# Patient Record
Sex: MALE | Race: WHITE | NOT HISPANIC OR LATINO | Employment: OTHER | RURAL
[De-identification: names, ages, dates, MRNs, and addresses within clinical notes are randomized per-mention and may not be internally consistent; named-entity substitution may affect disease eponyms.]

---

## 2021-05-27 ENCOUNTER — OFFICE VISIT (OUTPATIENT)
Dept: FAMILY MEDICINE | Facility: CLINIC | Age: 66
End: 2021-05-27
Payer: COMMERCIAL

## 2021-05-27 VITALS
HEIGHT: 68 IN | BODY MASS INDEX: 35.49 KG/M2 | DIASTOLIC BLOOD PRESSURE: 70 MMHG | HEART RATE: 67 BPM | RESPIRATION RATE: 20 BRPM | OXYGEN SATURATION: 98 % | WEIGHT: 234.19 LBS | TEMPERATURE: 98 F | SYSTOLIC BLOOD PRESSURE: 109 MMHG

## 2021-05-27 DIAGNOSIS — I10 ESSENTIAL HYPERTENSION: ICD-10-CM

## 2021-05-27 DIAGNOSIS — R11.0 NAUSEA: ICD-10-CM

## 2021-05-27 DIAGNOSIS — R51.9 ACUTE NONINTRACTABLE HEADACHE, UNSPECIFIED HEADACHE TYPE: ICD-10-CM

## 2021-05-27 DIAGNOSIS — R05.9 COUGH: ICD-10-CM

## 2021-05-27 DIAGNOSIS — Z20.822 EXPOSURE TO COVID-19 VIRUS: ICD-10-CM

## 2021-05-27 DIAGNOSIS — J44.9 CHRONIC OBSTRUCTIVE PULMONARY DISEASE, UNSPECIFIED COPD TYPE: ICD-10-CM

## 2021-05-27 DIAGNOSIS — U07.1 COVID-19: Primary | ICD-10-CM

## 2021-05-27 DIAGNOSIS — R53.83 FATIGUE, UNSPECIFIED TYPE: ICD-10-CM

## 2021-05-27 DIAGNOSIS — R06.02 SOB (SHORTNESS OF BREATH): ICD-10-CM

## 2021-05-27 DIAGNOSIS — I25.10 CAD, MULTIPLE VESSEL: ICD-10-CM

## 2021-05-27 LAB
CTP QC/QA: YES
SARS-COV-2 AG RESP QL IA.RAPID: POSITIVE

## 2021-05-27 PROCEDURE — 1126F PR PAIN SEVERITY QUANTIFIED, NO PAIN PRESENT: ICD-10-PCS | Mod: ,,, | Performed by: NURSE PRACTITIONER

## 2021-05-27 PROCEDURE — 1126F AMNT PAIN NOTED NONE PRSNT: CPT | Mod: ,,, | Performed by: NURSE PRACTITIONER

## 2021-05-27 PROCEDURE — 3008F PR BODY MASS INDEX (BMI) DOCUMENTED: ICD-10-PCS | Mod: CPTII,,, | Performed by: NURSE PRACTITIONER

## 2021-05-27 PROCEDURE — 87426 SARS CORONAVIRUS 2 ANTIGEN POCT: ICD-10-PCS | Mod: QW,,, | Performed by: NURSE PRACTITIONER

## 2021-05-27 PROCEDURE — 99214 OFFICE O/P EST MOD 30 MIN: CPT | Mod: ,,, | Performed by: NURSE PRACTITIONER

## 2021-05-27 PROCEDURE — 99214 PR OFFICE/OUTPT VISIT, EST, LEVL IV, 30-39 MIN: ICD-10-PCS | Mod: ,,, | Performed by: NURSE PRACTITIONER

## 2021-05-27 PROCEDURE — 87426 SARSCOV CORONAVIRUS AG IA: CPT | Mod: QW,,, | Performed by: NURSE PRACTITIONER

## 2021-05-27 PROCEDURE — 3008F BODY MASS INDEX DOCD: CPT | Mod: CPTII,,, | Performed by: NURSE PRACTITIONER

## 2021-05-27 RX ORDER — HYDROCHLOROTHIAZIDE 25 MG/1
1 TABLET ORAL DAILY
COMMUNITY
Start: 2021-03-02

## 2021-05-27 RX ORDER — FOLIC ACID 1 MG/1
2 TABLET ORAL DAILY
COMMUNITY
Start: 2021-04-14

## 2021-05-27 RX ORDER — POTASSIUM CHLORIDE 750 MG/1
1 CAPSULE, EXTENDED RELEASE ORAL DAILY
COMMUNITY
Start: 2021-04-14 | End: 2023-09-05

## 2021-05-27 RX ORDER — PREDNISONE 10 MG/1
TABLET ORAL
Qty: 15 TABLET | Refills: 0 | Status: SHIPPED | OUTPATIENT
Start: 2021-05-27 | End: 2021-07-06 | Stop reason: ALTCHOICE

## 2021-05-27 RX ORDER — FUROSEMIDE 40 MG/1
TABLET ORAL
COMMUNITY
Start: 2021-03-02 | End: 2023-09-05

## 2021-05-27 RX ORDER — AZITHROMYCIN 250 MG/1
TABLET, FILM COATED ORAL
Qty: 6 TABLET | Refills: 0 | Status: SHIPPED | OUTPATIENT
Start: 2021-05-27 | End: 2021-07-06 | Stop reason: ALTCHOICE

## 2021-05-27 RX ORDER — ALBUTEROL SULFATE 90 UG/1
2 AEROSOL, METERED RESPIRATORY (INHALATION) EVERY 4 HOURS PRN
Qty: 18 G | Refills: 1 | Status: SHIPPED | OUTPATIENT
Start: 2021-05-27 | End: 2023-09-05

## 2021-05-27 RX ORDER — DULOXETIN HYDROCHLORIDE 30 MG/1
30 CAPSULE, DELAYED RELEASE ORAL DAILY
COMMUNITY
Start: 2021-04-21

## 2021-05-27 RX ORDER — METOPROLOL TARTRATE 25 MG/1
25 TABLET, FILM COATED ORAL DAILY
COMMUNITY
Start: 2021-05-03

## 2021-05-27 RX ORDER — LOSARTAN POTASSIUM AND HYDROCHLOROTHIAZIDE 12.5; 1 MG/1; MG/1
1 TABLET ORAL DAILY
COMMUNITY
Start: 2020-12-17

## 2021-05-27 RX ORDER — LOSARTAN POTASSIUM 100 MG/1
100 TABLET ORAL DAILY
COMMUNITY
Start: 2021-05-03

## 2021-05-27 RX ORDER — AMOXICILLIN 500 MG
1 CAPSULE ORAL DAILY
COMMUNITY

## 2021-05-27 RX ORDER — MULTIVIT WITH MINERALS/HERBS
1 TABLET ORAL DAILY
COMMUNITY

## 2021-05-27 RX ORDER — AMLODIPINE BESYLATE 10 MG/1
10 TABLET ORAL DAILY
COMMUNITY
Start: 2021-05-03

## 2021-07-06 ENCOUNTER — OFFICE VISIT (OUTPATIENT)
Dept: FAMILY MEDICINE | Facility: CLINIC | Age: 66
End: 2021-07-06
Payer: COMMERCIAL

## 2021-07-06 VITALS
DIASTOLIC BLOOD PRESSURE: 70 MMHG | RESPIRATION RATE: 20 BRPM | BODY MASS INDEX: 35.95 KG/M2 | OXYGEN SATURATION: 98 % | HEART RATE: 79 BPM | SYSTOLIC BLOOD PRESSURE: 114 MMHG | HEIGHT: 68 IN | TEMPERATURE: 98 F | WEIGHT: 237.19 LBS

## 2021-07-06 DIAGNOSIS — I25.10 CAD, MULTIPLE VESSEL: ICD-10-CM

## 2021-07-06 DIAGNOSIS — I10 ESSENTIAL HYPERTENSION: ICD-10-CM

## 2021-07-06 DIAGNOSIS — G62.9 NEUROPATHY: ICD-10-CM

## 2021-07-06 DIAGNOSIS — Z86.16 PERSONAL HISTORY OF COVID-19: ICD-10-CM

## 2021-07-06 DIAGNOSIS — R25.2 LEG CRAMPING: ICD-10-CM

## 2021-07-06 DIAGNOSIS — J44.9 CHRONIC OBSTRUCTIVE PULMONARY DISEASE, UNSPECIFIED COPD TYPE: Primary | ICD-10-CM

## 2021-07-06 PROCEDURE — 99213 PR OFFICE/OUTPT VISIT, EST, LEVL III, 20-29 MIN: ICD-10-PCS | Mod: ,,, | Performed by: NURSE PRACTITIONER

## 2021-07-06 PROCEDURE — 3008F PR BODY MASS INDEX (BMI) DOCUMENTED: ICD-10-PCS | Mod: CPTII,,, | Performed by: NURSE PRACTITIONER

## 2021-07-06 PROCEDURE — 1125F PR PAIN SEVERITY QUANTIFIED, PAIN PRESENT: ICD-10-PCS | Mod: ,,, | Performed by: NURSE PRACTITIONER

## 2021-07-06 PROCEDURE — 3008F BODY MASS INDEX DOCD: CPT | Mod: CPTII,,, | Performed by: NURSE PRACTITIONER

## 2021-07-06 PROCEDURE — 99213 OFFICE O/P EST LOW 20 MIN: CPT | Mod: ,,, | Performed by: NURSE PRACTITIONER

## 2021-07-06 PROCEDURE — 1125F AMNT PAIN NOTED PAIN PRSNT: CPT | Mod: ,,, | Performed by: NURSE PRACTITIONER

## 2021-07-06 RX ORDER — CHOLECALCIFEROL (VITAMIN D3) 25 MCG
1000 TABLET ORAL DAILY
COMMUNITY

## 2021-07-06 RX ORDER — CYCLOBENZAPRINE HCL 10 MG
10 TABLET ORAL NIGHTLY PRN
Qty: 30 TABLET | Refills: 2 | Status: SHIPPED | OUTPATIENT
Start: 2021-07-06 | End: 2021-08-30 | Stop reason: SDUPTHER

## 2021-08-30 DIAGNOSIS — R25.2 LEG CRAMPING: ICD-10-CM

## 2021-08-30 RX ORDER — CYCLOBENZAPRINE HCL 10 MG
10 TABLET ORAL NIGHTLY PRN
Qty: 30 TABLET | Refills: 2 | Status: SHIPPED | OUTPATIENT
Start: 2021-08-30

## 2022-11-28 LAB — HEMOCCULT STL QL IA: NEGATIVE

## 2023-09-05 ENCOUNTER — OFFICE VISIT (OUTPATIENT)
Dept: FAMILY MEDICINE | Facility: CLINIC | Age: 68
End: 2023-09-05
Payer: MEDICARE

## 2023-09-05 VITALS
TEMPERATURE: 99 F | RESPIRATION RATE: 20 BRPM | DIASTOLIC BLOOD PRESSURE: 74 MMHG | SYSTOLIC BLOOD PRESSURE: 147 MMHG | WEIGHT: 199.81 LBS | HEART RATE: 96 BPM | BODY MASS INDEX: 30.28 KG/M2 | OXYGEN SATURATION: 96 % | HEIGHT: 68 IN

## 2023-09-05 DIAGNOSIS — F33.9 DEPRESSION, RECURRENT: ICD-10-CM

## 2023-09-05 DIAGNOSIS — M10.9 ACUTE GOUT OF RIGHT WRIST, UNSPECIFIED CAUSE: Primary | ICD-10-CM

## 2023-09-05 DIAGNOSIS — Z79.899 ENCOUNTER FOR LONG-TERM (CURRENT) USE OF MEDICATIONS: ICD-10-CM

## 2023-09-05 DIAGNOSIS — Z12.11 SCREENING FOR COLON CANCER: ICD-10-CM

## 2023-09-05 DIAGNOSIS — Z12.11 SCREENING FOR COLON CANCER: Primary | ICD-10-CM

## 2023-09-05 DIAGNOSIS — Z13.31 POSITIVE DEPRESSION SCREENING: ICD-10-CM

## 2023-09-05 DIAGNOSIS — Z86.69 HISTORY OF CARPAL TUNNEL SYNDROME: ICD-10-CM

## 2023-09-05 DIAGNOSIS — Z13.1 SCREENING FOR DIABETES MELLITUS: ICD-10-CM

## 2023-09-05 DIAGNOSIS — Z13.220 SCREENING FOR LIPID DISORDERS: ICD-10-CM

## 2023-09-05 DIAGNOSIS — I25.10 CORONARY ARTERY DISEASE, UNSPECIFIED VESSEL OR LESION TYPE, UNSPECIFIED WHETHER ANGINA PRESENT, UNSPECIFIED WHETHER NATIVE OR TRANSPLANTED HEART: ICD-10-CM

## 2023-09-05 LAB
ALBUMIN SERPL BCP-MCNC: 3.2 G/DL (ref 3.5–5)
ALBUMIN/GLOB SERPL: 0.7 {RATIO}
ALP SERPL-CCNC: 112 U/L (ref 45–115)
ALT SERPL W P-5'-P-CCNC: 22 U/L (ref 16–61)
ANION GAP SERPL CALCULATED.3IONS-SCNC: 11 MMOL/L (ref 7–16)
AST SERPL W P-5'-P-CCNC: 23 U/L (ref 15–37)
BILIRUB SERPL-MCNC: 2.6 MG/DL (ref ?–1.2)
BUN SERPL-MCNC: 10 MG/DL (ref 7–18)
BUN/CREAT SERPL: 11 (ref 6–20)
CALCIUM SERPL-MCNC: 9.6 MG/DL (ref 8.5–10.1)
CHLORIDE SERPL-SCNC: 96 MMOL/L (ref 98–107)
CHOLEST SERPL-MCNC: 88 MG/DL (ref 0–200)
CHOLEST/HDLC SERPL: 2.8 {RATIO}
CO2 SERPL-SCNC: 29 MMOL/L (ref 21–32)
CREAT SERPL-MCNC: 0.94 MG/DL (ref 0.7–1.3)
EGFR (NO RACE VARIABLE) (RUSH/TITUS): 89 ML/MIN/1.73M2
EST. AVERAGE GLUCOSE BLD GHB EST-MCNC: 97 MG/DL
GLOBULIN SER-MCNC: 4.5 G/DL (ref 2–4)
GLUCOSE SERPL-MCNC: 125 MG/DL (ref 74–106)
HBA1C MFR BLD HPLC: 5.5 % (ref 4.5–6.6)
HDLC SERPL-MCNC: 31 MG/DL (ref 40–60)
LDLC SERPL CALC-MCNC: 37 MG/DL
LDLC/HDLC SERPL: 1.2 {RATIO}
NONHDLC SERPL-MCNC: 57 MG/DL
POTASSIUM SERPL-SCNC: 3.3 MMOL/L (ref 3.5–5.1)
PROT SERPL-MCNC: 7.7 G/DL (ref 6.4–8.2)
SODIUM SERPL-SCNC: 133 MMOL/L (ref 136–145)
TRIGL SERPL-MCNC: 100 MG/DL (ref 35–150)
URATE SERPL-MCNC: 6 MG/DL (ref 3.5–7.2)
VLDLC SERPL-MCNC: 20 MG/DL

## 2023-09-05 PROCEDURE — 80053 COMPREHENSIVE METABOLIC PANEL: ICD-10-PCS | Mod: ,,, | Performed by: CLINICAL MEDICAL LABORATORY

## 2023-09-05 PROCEDURE — 3077F PR MOST RECENT SYSTOLIC BLOOD PRESSURE >= 140 MM HG: ICD-10-PCS | Mod: ,,, | Performed by: NURSE PRACTITIONER

## 2023-09-05 PROCEDURE — 1160F RVW MEDS BY RX/DR IN RCRD: CPT | Mod: ,,, | Performed by: NURSE PRACTITIONER

## 2023-09-05 PROCEDURE — 3077F SYST BP >= 140 MM HG: CPT | Mod: ,,, | Performed by: NURSE PRACTITIONER

## 2023-09-05 PROCEDURE — 99214 OFFICE O/P EST MOD 30 MIN: CPT | Mod: 25,,, | Performed by: NURSE PRACTITIONER

## 2023-09-05 PROCEDURE — 83036 HEMOGLOBIN GLYCOSYLATED A1C: CPT | Mod: ,,, | Performed by: CLINICAL MEDICAL LABORATORY

## 2023-09-05 PROCEDURE — 99214 PR OFFICE/OUTPT VISIT, EST, LEVL IV, 30-39 MIN: ICD-10-PCS | Mod: 25,,, | Performed by: NURSE PRACTITIONER

## 2023-09-05 PROCEDURE — 3078F DIAST BP <80 MM HG: CPT | Mod: ,,, | Performed by: NURSE PRACTITIONER

## 2023-09-05 PROCEDURE — 1100F PTFALLS ASSESS-DOCD GE2>/YR: CPT | Mod: ,,, | Performed by: NURSE PRACTITIONER

## 2023-09-05 PROCEDURE — 3288F PR FALLS RISK ASSESSMENT DOCUMENTED: ICD-10-PCS | Mod: ,,, | Performed by: NURSE PRACTITIONER

## 2023-09-05 PROCEDURE — 3008F BODY MASS INDEX DOCD: CPT | Mod: ,,, | Performed by: NURSE PRACTITIONER

## 2023-09-05 PROCEDURE — 84550 URIC ACID: ICD-10-PCS | Mod: ,,, | Performed by: CLINICAL MEDICAL LABORATORY

## 2023-09-05 PROCEDURE — 1159F PR MEDICATION LIST DOCUMENTED IN MEDICAL RECORD: ICD-10-PCS | Mod: ,,, | Performed by: NURSE PRACTITIONER

## 2023-09-05 PROCEDURE — 96372 THER/PROPH/DIAG INJ SC/IM: CPT | Mod: ,,, | Performed by: NURSE PRACTITIONER

## 2023-09-05 PROCEDURE — 3078F PR MOST RECENT DIASTOLIC BLOOD PRESSURE < 80 MM HG: ICD-10-PCS | Mod: ,,, | Performed by: NURSE PRACTITIONER

## 2023-09-05 PROCEDURE — 4010F ACE/ARB THERAPY RXD/TAKEN: CPT | Mod: ,,, | Performed by: NURSE PRACTITIONER

## 2023-09-05 PROCEDURE — 1159F MED LIST DOCD IN RCRD: CPT | Mod: ,,, | Performed by: NURSE PRACTITIONER

## 2023-09-05 PROCEDURE — 96372 PR INJECTION,THERAP/PROPH/DIAG2ST, IM OR SUBCUT: ICD-10-PCS | Mod: ,,, | Performed by: NURSE PRACTITIONER

## 2023-09-05 PROCEDURE — 80061 LIPID PANEL: CPT | Mod: ,,, | Performed by: CLINICAL MEDICAL LABORATORY

## 2023-09-05 PROCEDURE — 1125F AMNT PAIN NOTED PAIN PRSNT: CPT | Mod: ,,, | Performed by: NURSE PRACTITIONER

## 2023-09-05 PROCEDURE — 83036 HEMOGLOBIN A1C: ICD-10-PCS | Mod: ,,, | Performed by: CLINICAL MEDICAL LABORATORY

## 2023-09-05 PROCEDURE — 4010F PR ACE/ARB THEARPY RXD/TAKEN: ICD-10-PCS | Mod: ,,, | Performed by: NURSE PRACTITIONER

## 2023-09-05 PROCEDURE — 80061 LIPID PANEL: ICD-10-PCS | Mod: ,,, | Performed by: CLINICAL MEDICAL LABORATORY

## 2023-09-05 PROCEDURE — 1160F PR REVIEW ALL MEDS BY PRESCRIBER/CLIN PHARMACIST DOCUMENTED: ICD-10-PCS | Mod: ,,, | Performed by: NURSE PRACTITIONER

## 2023-09-05 PROCEDURE — 3008F PR BODY MASS INDEX (BMI) DOCUMENTED: ICD-10-PCS | Mod: ,,, | Performed by: NURSE PRACTITIONER

## 2023-09-05 PROCEDURE — 1125F PR PAIN SEVERITY QUANTIFIED, PAIN PRESENT: ICD-10-PCS | Mod: ,,, | Performed by: NURSE PRACTITIONER

## 2023-09-05 PROCEDURE — 1100F PR PT FALLS ASSESS DOC 2+ FALLS/FALL W/INJURY/YR: ICD-10-PCS | Mod: ,,, | Performed by: NURSE PRACTITIONER

## 2023-09-05 PROCEDURE — 80053 COMPREHEN METABOLIC PANEL: CPT | Mod: ,,, | Performed by: CLINICAL MEDICAL LABORATORY

## 2023-09-05 PROCEDURE — 84550 ASSAY OF BLOOD/URIC ACID: CPT | Mod: ,,, | Performed by: CLINICAL MEDICAL LABORATORY

## 2023-09-05 PROCEDURE — 3288F FALL RISK ASSESSMENT DOCD: CPT | Mod: ,,, | Performed by: NURSE PRACTITIONER

## 2023-09-05 RX ORDER — PREDNISONE 10 MG/1
TABLET ORAL
Qty: 14 TABLET | Refills: 0 | Status: SHIPPED | OUTPATIENT
Start: 2023-09-05 | End: 2023-09-05

## 2023-09-05 RX ORDER — DEXAMETHASONE SODIUM PHOSPHATE 4 MG/ML
4 INJECTION, SOLUTION INTRA-ARTICULAR; INTRALESIONAL; INTRAMUSCULAR; INTRAVENOUS; SOFT TISSUE
Status: COMPLETED | OUTPATIENT
Start: 2023-09-05 | End: 2023-09-05

## 2023-09-05 RX ORDER — COLCHICINE 0.6 MG/1
0.6 TABLET ORAL DAILY
Qty: 15 TABLET | Refills: 0 | Status: SHIPPED | OUTPATIENT
Start: 2023-09-05 | End: 2023-09-05

## 2023-09-05 RX ORDER — KETOROLAC TROMETHAMINE 30 MG/ML
30 INJECTION, SOLUTION INTRAMUSCULAR; INTRAVENOUS ONCE
Status: COMPLETED | OUTPATIENT
Start: 2023-09-05 | End: 2023-09-05

## 2023-09-05 RX ORDER — ALLOPURINOL 300 MG/1
300 TABLET ORAL DAILY
COMMUNITY
Start: 2023-06-09

## 2023-09-05 RX ORDER — POLYETHYLENE GLYCOL 3350, SODIUM SULFATE ANHYDROUS, SODIUM BICARBONATE, SODIUM CHLORIDE, POTASSIUM CHLORIDE 236; 22.74; 6.74; 5.86; 2.97 G/4L; G/4L; G/4L; G/4L; G/4L
4 POWDER, FOR SOLUTION ORAL ONCE
Qty: 4000 ML | Refills: 0 | Status: SHIPPED | OUTPATIENT
Start: 2023-09-05 | End: 2023-09-05

## 2023-09-05 RX ORDER — PREGABALIN 100 MG/1
100 CAPSULE ORAL DAILY
COMMUNITY
Start: 2023-06-14

## 2023-09-05 RX ORDER — EVOLOCUMAB 140 MG/ML
140 INJECTION, SOLUTION SUBCUTANEOUS
COMMUNITY
Start: 2023-08-04

## 2023-09-05 RX ORDER — POTASSIUM CHLORIDE 1500 MG/1
20 TABLET, EXTENDED RELEASE ORAL 2 TIMES DAILY
COMMUNITY

## 2023-09-05 RX ORDER — PREDNISONE 10 MG/1
TABLET ORAL
Qty: 14 TABLET | Refills: 0 | Status: SHIPPED | OUTPATIENT
Start: 2023-09-05 | End: 2023-09-11

## 2023-09-05 RX ORDER — FUROSEMIDE 80 MG/1
80 TABLET ORAL 2 TIMES DAILY
COMMUNITY
Start: 2023-07-13

## 2023-09-05 RX ORDER — HYDROCODONE BITARTRATE AND ACETAMINOPHEN 10; 325 MG/1; MG/1
1 TABLET ORAL EVERY 6 HOURS PRN
COMMUNITY
Start: 2023-08-24

## 2023-09-05 RX ORDER — COLCHICINE 0.6 MG/1
TABLET ORAL
Qty: 15 TABLET | Refills: 0 | Status: SHIPPED | OUTPATIENT
Start: 2023-09-05

## 2023-09-05 RX ADMIN — KETOROLAC TROMETHAMINE 30 MG: 30 INJECTION, SOLUTION INTRAMUSCULAR; INTRAVENOUS at 08:09

## 2023-09-05 RX ADMIN — DEXAMETHASONE SODIUM PHOSPHATE 4 MG: 4 INJECTION, SOLUTION INTRA-ARTICULAR; INTRALESIONAL; INTRAMUSCULAR; INTRAVENOUS; SOFT TISSUE at 08:09

## 2023-09-05 NOTE — PROGRESS NOTES
GILDARDO Amador   [facility]      PATIENT NAME: MARLO JONES  : 1955  DATE: 23  MRN: [id number]      Patient PCP Information       Provider PCP Type    GILDARDO Arguello [facility]            Reason for Visit / Chief Complaint: Hand Pain and Arm Pain (Patient presents to clinic for referral for carpal tunnel surgery pt states even pain pills are not helping. RM 1)      History of Present Illness / Problem Focused Workflow     MARLO JONES presents to the clinic with Hand Pain and Arm Pain (Patient presents to clinic for referral for carpal tunnel surgery pt states even pain pills are not helping. RM 1)     HPI  Mr Jones presents to clinic with right hand pain and swelling. Patient presents to clinic in carpal tunnel splint and sling. Patient reports pain and swelling radiating from hand, migrating to wrist and elbow. Patient has prior medical hx of COPD, CAD (stent and CABG), hyperlipidemia, HTN, and neuropathy. Patient states he was followed by Sheryl in past for carpal tunnel. States surgery was recommended in past.   Patient does take allopurinol 300 mg daily for gout prophylaxis. States flares have been in hand and great toe in past.   Reports decreased urine output and weight loss. Noncompliant with follow up with PCP.       Review of Systems     Review of Systems   Constitutional:  Negative for activity change, appetite change, chills, diaphoresis, fatigue, fever and unexpected weight change.   HENT:  Negative for congestion, ear pain, facial swelling, hearing loss, nosebleeds and sore throat.    Eyes: Negative.    Respiratory:  Negative for apnea, cough, shortness of breath and wheezing.    Cardiovascular:  Negative for chest pain, palpitations and leg swelling.   Gastrointestinal:  Negative for abdominal distention, abdominal pain, blood in stool, constipation, diarrhea and nausea.   Endocrine: Negative for cold intolerance, heat intolerance, polydipsia, polyphagia  and polyuria.   Genitourinary:  Negative for decreased urine volume, difficulty urinating, dysuria, flank pain, frequency, hematuria and urgency.   Musculoskeletal:  Positive for arthralgias and joint swelling. Negative for myalgias.   Skin:  Negative for color change and rash.   Allergic/Immunologic: Negative.    Neurological:  Negative for dizziness, tremors, seizures, syncope, facial asymmetry, speech difficulty, weakness, light-headedness, numbness and headaches.   Hematological:  Negative for adenopathy. Does not bruise/bleed easily.   Psychiatric/Behavioral:  Negative for behavioral problems and confusion.        Medical / Social / Family History     Past Medical History:   Diagnosis Date    CAD, multiple vessel     COPD (chronic obstructive pulmonary disease)     Heart disease     Hyperlipidemia     Hypertension     Myocardial infarction 2018    Neuropathy        Past Surgical History:   Procedure Laterality Date    CORONARY ARTERY BYPASS GRAFT (CABG)  2017    per Dr. Richards       Social History    reports that he quit smoking about 53 years ago. His smoking use included cigarettes. He has been exposed to tobacco smoke. He has never used smokeless tobacco. He reports current alcohol use. He reports that he does not use drugs.    Family History  's family history includes Alzheimer's disease in his father; Cancer in his sister; Heart disease in his brother, brother, daughter, and mother; No Known Problems in his daughter, son, and son.    Medications and Allergies     Medications  Outpatient Medications Marked as Taking for the 9/5/23 encounter (Office Visit) with Orquidea Maravilla FNP   Medication Sig Dispense Refill    allopurinoL (ZYLOPRIM) 300 MG tablet Take 300 mg by mouth once daily.      amLODIPine (NORVASC) 10 MG tablet Take 10 mg by mouth once daily.      b complex vitamins tablet Take 1 tablet by mouth once daily.      cyclobenzaprine (FLEXERIL) 10 MG tablet Take 1 tablet (10 mg total) by mouth  nightly as needed for Muscle spasms. 30 tablet 2    DULoxetine (CYMBALTA) 30 MG capsule Take 30 mg by mouth once daily.      folic acid (FOLVITE) 1 MG tablet Take 2 mg by mouth once daily.      furosemide (LASIX) 80 MG tablet Take 80 mg by mouth 2 (two) times daily.      hydroCHLOROthiazide (HYDRODIURIL) 25 MG tablet Take 1 tablet by mouth once daily.      HYDROcodone-acetaminophen (NORCO)  mg per tablet Take 1 tablet by mouth every 6 (six) hours as needed.      losartan (COZAAR) 100 MG tablet Take 100 mg by mouth once daily.      losartan-hydrochlorothiazide 100-12.5 mg (HYZAAR) 100-12.5 mg Tab Take 1 tablet by mouth once daily.      magnesium chloride (MAG 64 ORAL) Take 2 tablets by mouth once daily.      metoprolol tartrate (LOPRESSOR) 25 MG tablet Take 25 mg by mouth once daily.      omega-3 fatty acids/fish oil (FISH OIL-OMEGA-3 FATTY ACIDS) 300-1,000 mg capsule Take 1 capsule by mouth once daily.      potassium chloride (K-TAB) 20 mEq Take 20 mEq by mouth 2 (two) times a day.      pregabalin (LYRICA) 100 MG capsule Take 100 mg by mouth once daily.      REPATHA SURECLICK 140 mg/mL PnIj Inject 140 mg into the skin every 14 (fourteen) days.      vitamin D (VITAMIN D3) 1000 units Tab Take 1,000 Units by mouth once daily.      ZINC ORAL Take 1 tablet by mouth once daily.      [DISCONTINUED] potassium chloride (MICRO-K) 10 MEQ CpSR Take 1 mEq by mouth once daily.       Current Facility-Administered Medications for the 9/5/23 encounter (Office Visit) with Orquidea Maravilla FNP   Medication Dose Route Frequency Provider Last Rate Last Admin    [COMPLETED] dexAMETHasone injection 4 mg  4 mg Intramuscular 1 time in Clinic/HOD Orquidea Maravilla FNP   4 mg at 09/05/23 0802    [COMPLETED] ketorolac injection 30 mg  30 mg Intramuscular Once Orquidea Maravilla FNP   30 mg at 09/05/23 0802       Allergies  Review of patient's allergies indicates:   Allergen Reactions    Lisinopril Shortness Of Breath     Patient  "stated that his throat closed and face swelling had to be put on vent.       Physical Examination     Vitals:    09/05/23 0735 09/05/23 0737   BP: (!) 156/90 (!) 147/74   BP Location: Left arm    Patient Position: Sitting    BP Method: Medium (Automatic)    Pulse: 96    Resp: 20    Temp: 98.8 °F (37.1 °C)    TempSrc: Oral    SpO2: 96%    Weight: 90.6 kg (199 lb 12.8 oz)    Height: 5' 8" (1.727 m)    Over the last two weeks how often have you been bothered by little interest or pleasure in doing things: 1  Over the last two weeks how often have you been bothered by feeling down, depressed or hopeless: 0  PHQ-2 Total Score: 1  PHQ-9 Score: 5  PHQ-9 Interpretation: Mild    I have used clinical judgement based on duration and functional status to consider definite necessity for treatment.   Physical Exam  Vitals reviewed.   Constitutional:       Appearance: Normal appearance. He is obese.   HENT:      Head: Normocephalic.      Right Ear: External ear normal.      Left Ear: External ear normal.      Nose: Nose normal.      Mouth/Throat:      Mouth: Mucous membranes are moist.   Eyes:      Extraocular Movements: Extraocular movements intact.   Cardiovascular:      Rate and Rhythm: Normal rate.      Pulses: Normal pulses.   Pulmonary:      Effort: Pulmonary effort is normal.   Musculoskeletal:      Right upper arm: Swelling and tenderness present.      Right wrist: Swelling and tenderness present. Decreased range of motion.      Right hand: Swelling and tenderness present. Decreased range of motion.      Cervical back: Normal range of motion.      Comments: Erythema noted over PIP joint thumb hand swelling extending to wrist  Decreased ROM secondary to pain   Skin:     General: Skin is warm.      Capillary Refill: Capillary refill takes less than 2 seconds.   Neurological:      General: No focal deficit present.      Mental Status: He is alert and oriented to person, place, and time.   Psychiatric:         Mood and Affect: " Mood normal.         Behavior: Behavior normal.         Thought Content: Thought content normal.         Judgment: Judgment normal.           No visits with results within 14 Day(s) from this visit.   Latest known visit with results is:   Office Visit on 05/27/2021   Component Date Value Ref Range Status    SARS Coronavirus 2 Antigen 05/27/2021 Positive (A)  Negative Final     Acceptable 05/27/2021 Yes   Final             Assessment and Plan (including Health Maintenance)         Plan:   Acute gout of right wrist, unspecified cause  -     dexAMETHasone injection 4 mg  -     ketorolac injection 30 mg  -     Uric Acid; Future; Expected date: 09/05/2023  -     predniSONE (DELTASONE) 10 MG tablet; Take 4 tablets (40 mg total) by mouth once daily for 2 days, THEN 2 tablets (20 mg total) once daily for 2 days, THEN 1 tablet (10 mg total) once daily for 2 days.  Dispense: 14 tablet; Refill: 0  -     colchicine, gout, (COLCRYS) 0.6 mg tablet; 1.2 mg (2 tablets) PO x 1, then 0.6 mg in 1 hour. Do not repeat doing for 72 hours  Dispense: 15 tablet; Refill: 0  -continue allopurinol  -CMP     History of carpal tunnel syndrome  -     Ambulatory referral/consult to Orthopedics; Future; Expected date: 09/12/2023    Screening for diabetes mellitus  -     Comprehensive Metabolic Panel; Future; Expected date: 09/05/2023  -     Hemoglobin A1C; Future; Expected date: 09/05/2023    Screening for lipid disorders  -     Lipid Panel; Future; Expected date: 09/05/2023    Screening for colon cancer  -     Colonoscopy; Future; Expected date: 09/05/2023    Encounter for long-term (current) use of medications  -     Hemoglobin A1C; Future; Expected date: 09/05/2023    BMI 30.0-30.9,adult  -     Comprehensive Metabolic Panel; Future; Expected date: 09/05/2023  -     Lipid Panel; Future; Expected date: 09/05/2023    Coronary artery disease, unspecified vessel or lesion type, unspecified whether angina present, unspecified whether  native or transplanted heart  -     Comprehensive Metabolic Panel; Future; Expected date: 09/05/2023  -     Lipid Panel; Future; Expected date: 09/05/2023  -     Hemoglobin A1C; Future; Expected date: 09/05/2023  -     followed by Maya    Positive depression screening/ Hx of depressive disorder  Comments:  I have reviewed the positive depression score with the patient and found that no additional intervention is needed at this time. In pain today, repeat screening on follow up               Follow up with PCP continue current medication     Orquidea DE LA CRUZ  There are no Patient Instructions on file for this visit.       Health Maintenance Due   Topic Date Due    Hepatitis C Screening  Never done    Lipid Panel  Never done    COVID-19 Vaccine (1) Never done    Pneumococcal Vaccines (Age 65+) (1 - PCV) Never done    TETANUS VACCINE  Never done    High Dose Statin  Never done    Hemoglobin A1c (Diabetic Prevention Screening)  Never done    Colorectal Cancer Screening  Never done    Shingles Vaccine (1 of 2) Never done    Abdominal Aortic Aneurysm Screening  Never done    Influenza Vaccine (1) Never done         There is no immunization history on file for this patient.     Problem List Items Addressed This Visit          Psychiatric    Depression, recurrent     Other Visit Diagnoses       Acute gout of right wrist, unspecified cause    -  Primary    Relevant Medications    dexAMETHasone injection 4 mg (Completed)    ketorolac injection 30 mg (Completed)    predniSONE (DELTASONE) 10 MG tablet    colchicine, gout, (COLCRYS) 0.6 mg tablet    Other Relevant Orders    Uric Acid    History of carpal tunnel syndrome        Relevant Orders    Ambulatory referral/consult to Orthopedics    Screening for diabetes mellitus        Relevant Orders    Comprehensive Metabolic Panel    Hemoglobin A1C    Screening for lipid disorders        Relevant Orders    Lipid Panel    Screening for colon cancer        Relevant Orders     Colonoscopy    Encounter for long-term (current) use of medications        Relevant Orders    Hemoglobin A1C    BMI 30.0-30.9,adult        Relevant Orders    Comprehensive Metabolic Panel    Lipid Panel    Coronary artery disease, unspecified vessel or lesion type, unspecified whether angina present, unspecified whether native or transplanted heart        Relevant Orders    Comprehensive Metabolic Panel    Lipid Panel    Hemoglobin A1C    Positive depression screening        I have reviewed the positive depression score with the patient and found that no additional intervention is needed at this time.            The patient has no Health Maintenance topics of status Not Due    Future Appointments   Date Time Provider Department Center   2/22/2024  9:30 AM [facility]              Signature:  GILDARDO Amador  [facility]     Date of encounter: 9/5/23

## 2023-09-09 DIAGNOSIS — Z71.89 COMPLEX CARE COORDINATION: ICD-10-CM

## 2023-09-21 DIAGNOSIS — M25.532 PAIN IN BOTH WRISTS: Primary | ICD-10-CM

## 2023-09-21 DIAGNOSIS — M25.531 PAIN IN BOTH WRISTS: Primary | ICD-10-CM

## 2023-09-22 ENCOUNTER — OFFICE VISIT (OUTPATIENT)
Dept: ORTHOPEDICS | Facility: CLINIC | Age: 68
End: 2023-09-22
Payer: MEDICARE

## 2023-09-22 ENCOUNTER — HOSPITAL ENCOUNTER (OUTPATIENT)
Dept: RADIOLOGY | Facility: HOSPITAL | Age: 68
Discharge: HOME | End: 2023-09-22
Attending: ORTHOPAEDIC SURGERY
Payer: MEDICARE

## 2023-09-22 DIAGNOSIS — M25.531 PAIN IN BOTH WRISTS: Primary | ICD-10-CM

## 2023-09-22 DIAGNOSIS — M19.031 PRIMARY OSTEOARTHRITIS OF BOTH WRISTS: ICD-10-CM

## 2023-09-22 DIAGNOSIS — M25.531 PAIN IN BOTH WRISTS: ICD-10-CM

## 2023-09-22 DIAGNOSIS — M25.532 PAIN IN BOTH WRISTS: ICD-10-CM

## 2023-09-22 DIAGNOSIS — M25.532 PAIN IN BOTH WRISTS: Primary | ICD-10-CM

## 2023-09-22 DIAGNOSIS — Z86.69 HISTORY OF CARPAL TUNNEL SYNDROME: ICD-10-CM

## 2023-09-22 DIAGNOSIS — M19.032 PRIMARY OSTEOARTHRITIS OF BOTH WRISTS: ICD-10-CM

## 2023-09-22 PROCEDURE — 20605 PR DRAIN/INJECT INTERMEDIATE JOINT/BURSA: ICD-10-PCS | Mod: S$PBB,RT,, | Performed by: ORTHOPAEDIC SURGERY

## 2023-09-22 PROCEDURE — 73110 XR WRIST COMPLETE 3 VIEWS BILATERAL: ICD-10-PCS | Mod: 26,50,, | Performed by: ORTHOPAEDIC SURGERY

## 2023-09-22 PROCEDURE — 3044F PR MOST RECENT HEMOGLOBIN A1C LEVEL <7.0%: ICD-10-PCS | Mod: CPTII,,, | Performed by: ORTHOPAEDIC SURGERY

## 2023-09-22 PROCEDURE — 20605 DRAIN/INJ JOINT/BURSA W/O US: CPT | Mod: PBBFAC,RT | Performed by: ORTHOPAEDIC SURGERY

## 2023-09-22 PROCEDURE — 1160F PR REVIEW ALL MEDS BY PRESCRIBER/CLIN PHARMACIST DOCUMENTED: ICD-10-PCS | Mod: CPTII,,, | Performed by: ORTHOPAEDIC SURGERY

## 2023-09-22 PROCEDURE — 99999PBSHW PR PBB SHADOW TECHNICAL ONLY FILED TO HB: ICD-10-PCS | Mod: PBBFAC,,,

## 2023-09-22 PROCEDURE — 73110 X-RAY EXAM OF WRIST: CPT | Mod: 26,50,, | Performed by: ORTHOPAEDIC SURGERY

## 2023-09-22 PROCEDURE — 99214 OFFICE O/P EST MOD 30 MIN: CPT | Mod: PBBFAC,25 | Performed by: ORTHOPAEDIC SURGERY

## 2023-09-22 PROCEDURE — 1159F PR MEDICATION LIST DOCUMENTED IN MEDICAL RECORD: ICD-10-PCS | Mod: CPTII,,, | Performed by: ORTHOPAEDIC SURGERY

## 2023-09-22 PROCEDURE — 99204 OFFICE O/P NEW MOD 45 MIN: CPT | Mod: S$PBB,25,, | Performed by: ORTHOPAEDIC SURGERY

## 2023-09-22 PROCEDURE — 20605 DRAIN/INJ JOINT/BURSA W/O US: CPT | Mod: S$PBB,RT,, | Performed by: ORTHOPAEDIC SURGERY

## 2023-09-22 PROCEDURE — 1160F RVW MEDS BY RX/DR IN RCRD: CPT | Mod: CPTII,,, | Performed by: ORTHOPAEDIC SURGERY

## 2023-09-22 PROCEDURE — 4010F PR ACE/ARB THEARPY RXD/TAKEN: ICD-10-PCS | Mod: CPTII,,, | Performed by: ORTHOPAEDIC SURGERY

## 2023-09-22 PROCEDURE — 4010F ACE/ARB THERAPY RXD/TAKEN: CPT | Mod: CPTII,,, | Performed by: ORTHOPAEDIC SURGERY

## 2023-09-22 PROCEDURE — 1159F MED LIST DOCD IN RCRD: CPT | Mod: CPTII,,, | Performed by: ORTHOPAEDIC SURGERY

## 2023-09-22 PROCEDURE — 3044F HG A1C LEVEL LT 7.0%: CPT | Mod: CPTII,,, | Performed by: ORTHOPAEDIC SURGERY

## 2023-09-22 PROCEDURE — 73110 X-RAY EXAM OF WRIST: CPT | Mod: TC,50

## 2023-09-22 PROCEDURE — 99999PBSHW PR PBB SHADOW TECHNICAL ONLY FILED TO HB: Mod: PBBFAC,,,

## 2023-09-22 PROCEDURE — 99204 PR OFFICE/OUTPT VISIT, NEW, LEVL IV, 45-59 MIN: ICD-10-PCS | Mod: S$PBB,25,, | Performed by: ORTHOPAEDIC SURGERY

## 2023-09-22 RX ORDER — BUPIVACAINE HYDROCHLORIDE 2.5 MG/ML
1 INJECTION, SOLUTION INFILTRATION; PERINEURAL
Status: COMPLETED | OUTPATIENT
Start: 2023-09-22 | End: 2023-09-22

## 2023-09-22 RX ORDER — TRIAMCINOLONE ACETONIDE 40 MG/ML
40 INJECTION, SUSPENSION INTRA-ARTICULAR; INTRAMUSCULAR
Status: COMPLETED | OUTPATIENT
Start: 2023-09-22 | End: 2023-09-22

## 2023-09-22 RX ADMIN — TRIAMCINOLONE ACETONIDE 40 MG: 40 INJECTION, SUSPENSION INTRA-ARTICULAR; INTRAMUSCULAR at 12:09

## 2023-09-22 RX ADMIN — BUPIVACAINE HYDROCHLORIDE 2.5 MG: 2.5 INJECTION, SOLUTION INFILTRATION; PERINEURAL at 12:09

## 2023-09-22 NOTE — PROGRESS NOTES
CLINIC NOTE       Chief Complaint   Patient presents with    Left Hand - Pain    Right Hand - Pain        MARLO HINOJOSA is a 67 y.o. male seen today for evaluation of right wrist pain.  Symptoms began insidiously with no history of trauma.  Has history of peripheral neuropathy of the upper and lower extremities documented with EMG nerve conduction studies performed by Dr. Lo might 04/19/2021.  He has altered sensation in the hands and glove distribution with no difference between median and ulnar nerve distributions.  The nerve study did show some mild carpal tunnel syndrome.  Presently he is experiencing pain in the dorsal aspect of the right wrist.  He does have history of gout and takes allopurinol.  He is on Lyrica for his peripheral neuropathy.  Has a serum uric acid levels checked with his cardiologist and primary care physician.    Past Medical History:   Diagnosis Date    CAD, multiple vessel     COPD (chronic obstructive pulmonary disease)     Heart disease     Hyperlipidemia     Hypertension     Myocardial infarction 2018    Neuropathy      Family History   Problem Relation Age of Onset    Heart disease Mother     Alzheimer's disease Father     Cancer Sister         Breast    Heart disease Brother     Heart disease Daughter     No Known Problems Son     Heart disease Brother     No Known Problems Daughter     No Known Problems Son      Current Outpatient Medications on File Prior to Visit   Medication Sig Dispense Refill    allopurinoL (ZYLOPRIM) 300 MG tablet Take 300 mg by mouth once daily.      amLODIPine (NORVASC) 10 MG tablet Take 10 mg by mouth once daily.      b complex vitamins tablet Take 1 tablet by mouth once daily.      colchicine, gout, (COLCRYS) 0.6 mg tablet 1.2 mg (2 tablets) PO x 1, then 0.6 mg in 1 hour. Do not repeat doing for 72 hours 15 tablet 0    cyclobenzaprine (FLEXERIL) 10 MG tablet Take 1 tablet (10 mg total) by mouth nightly as needed for Muscle spasms. 30 tablet 2     DULoxetine (CYMBALTA) 30 MG capsule Take 30 mg by mouth once daily.      folic acid (FOLVITE) 1 MG tablet Take 2 mg by mouth once daily.      furosemide (LASIX) 80 MG tablet Take 80 mg by mouth 2 (two) times daily.      hydroCHLOROthiazide (HYDRODIURIL) 25 MG tablet Take 1 tablet by mouth once daily.      HYDROcodone-acetaminophen (NORCO)  mg per tablet Take 1 tablet by mouth every 6 (six) hours as needed.      losartan (COZAAR) 100 MG tablet Take 100 mg by mouth once daily.      losartan-hydrochlorothiazide 100-12.5 mg (HYZAAR) 100-12.5 mg Tab Take 1 tablet by mouth once daily.      magnesium chloride (MAG 64 ORAL) Take 2 tablets by mouth once daily.      metoprolol tartrate (LOPRESSOR) 25 MG tablet Take 25 mg by mouth once daily.      omega-3 fatty acids/fish oil (FISH OIL-OMEGA-3 FATTY ACIDS) 300-1,000 mg capsule Take 1 capsule by mouth once daily.      potassium chloride (K-TAB) 20 mEq Take 20 mEq by mouth 2 (two) times a day.      pregabalin (LYRICA) 100 MG capsule Take 100 mg by mouth once daily.      REPATHA SURECLICK 140 mg/mL PnIj Inject 140 mg into the skin every 14 (fourteen) days.      vitamin D (VITAMIN D3) 1000 units Tab Take 1,000 Units by mouth once daily.      ZINC ORAL Take 1 tablet by mouth once daily.       No current facility-administered medications on file prior to visit.       ROS     There were no vitals filed for this visit.    Past Surgical History:   Procedure Laterality Date    CORONARY ARTERY BYPASS GRAFT (CABG)  2017    per Dr. Richards        Review of patient's allergies indicates:   Allergen Reactions    Lisinopril Shortness Of Breath     Patient stated that his throat closed and face swelling had to be put on vent.        Ortho Exam well-developed well-nourished  male no acute distress.  Alert oriented cooperative.  Neck is supple without JVD.  Breathing is regular nonlabored.  Skin is warm dry no lesions seen.  Right wrist is normal contour.  No erythema.  There is  diffuse tenderness palpation over the dorsal carpus.  No significant tenderness palpation over the basilar joint.  Negative basilar joint grind test.  Decreased sensation light touch in the thumb in all lesser fingers with good cap refill to all fingertips and thumb.    Radiographic Examination:  Right wrist 09/22/2023    Technique:  Three views AP lateral oblique    Findings:  Bones well mineralized.  Carpus normally aligned.  There is moderate diffuse DJD of the carpus with cyst formation and some bony resorption involving the ulnar styloid.  Impression:   See Above  Radiographic examination: Left wrist 09/22/2023  Technique three views AP lateral oblique findings: The bones well mineralized.  Carpus normally aligned.  There is mild-to-moderate diffuse DJD of the carpus.  No evidence of acute fracture, dislocation or pathologic bone.  Assessment and Plan  Patient Active Problem List    Diagnosis Date Noted    Depression, recurrent 09/05/2023    Leg cramping 07/06/2021    Personal history of COVID-19 05/27/2021    CAD, multiple vessel     COPD (chronic obstructive pulmonary disease)     Hypertension     Hyperlipidemia     Neuropathy     Impression:  1.  Idiopathic Peripheral neuropathy upper and lower extremities 2.  Moderate DJD-both wrists right greater than left 3. History of gout   Plan:  The right wrist prepped with Betadine and intercarpal steroid injection performed through a dorsal approach with triamcinolone and Marcaine 1 cc each.  Recheck if symptoms persist or worsen.    Kodi Carrasco M.D.

## 2023-10-09 ENCOUNTER — PATIENT OUTREACH (OUTPATIENT)
Dept: ADMINISTRATIVE | Facility: HOSPITAL | Age: 68
End: 2023-10-09

## 2023-10-16 ENCOUNTER — PATIENT OUTREACH (OUTPATIENT)
Dept: ADMINISTRATIVE | Facility: HOSPITAL | Age: 68
End: 2023-10-16

## 2023-10-16 NOTE — PROGRESS NOTES
Population Health Chart Review & Patient Outreach Details:     Reason for Outreach Encounter:     [x]  Non-Compliant Report   []  Payor Report ([employer], PHN, BCBS, MSSP, MCIP, UHC, etc.)   []  Pre-Visit Chart Review     Updates Requested / Reviewed:     []  Care Everywhere    []     []  External Sources ([facility], [facility], DIS, etc.)   []  Care Team Updated    Patient Outreach Method:    []  Telephone Outreach Completed   [] Successful   [] Left Voicemail   [] Unable to Contact (wrong number, no voicemail)  []  [employer] Portal Outreach Sent  []  Letter Outreach Mailed  []  Fax Sent for External Records  [x]  External Records Upload    Health Maintenance Topics Addressed and Outreach Outcomes / Actions Taken:        []      Breast Cancer Screening []  Mammo Scheduled      []  External Records Requested     []  Added Reminder to Complete to Upcoming Primary Care Appt Notes     []  Patient Declined     []  Patient Will Call Back to Schedule     []  Patient Will Schedule with External Provider / Order Routed if Applicable             []       Cervical Cancer Screening []  Pap Scheduled      []  External Records Requested     []  Added Reminder to Complete to Upcoming Primary Care Appt Notes     []  Patient Declined     []  Patient Will Call Back to Schedule     []  Patient Will Schedule with External Provider               [x]          Colorectal Cancer Screening []  Colonoscopy Case Request or Referral Placed     []  External Records Requested     []  Added Reminder to Complete to Upcoming Primary Care Appt Notes     []  Patient Declined     []  Patient Will Call Back to Schedule     []  Patient Will Schedule with External Provider     []  Fit Kit Mailed (add the SmartPhrase under additional notes)     []  Reminded Patient to Complete Home Test             []      Diabetic Eye Exam []  Eye Camera Scheduled or Optometry Referral Placed     []  External Records Requested     []  Added Reminder to Complete to  Upcoming Primary Care Appt Notes     []  Patient Declined     []  Patient Will Call Back to Schedule     []  Patient Will Schedule with External Provider             []      Blood Pressure Control []  Primary Care Follow Up Visit Scheduled     []  Remote Blood Pressure Reading Captured     []  Added Reminder to Complete to Upcoming Primary Care Appt Notes     []  Patient Declined     []  Patient Will Call Back / Patient Will Send Portal Message with Reading     []  Patient Will Call Back to Schedule Provider Visit             []       HbA1c & Other Labs []  Lab Appt Scheduled for Due Labs     []  Primary Care Follow Up Visit Scheduled      []  Reminded Patient to Complete Home Test     []  Added Reminder to Complete to Upcoming Primary Care Appt Notes     []  Patient Declined     []  Patient Will Call Back to Schedule     []  Patient Will Schedule with External Provider / Order Routed if Applicable           []    Schedule Primary Care Appt []  Primary Care Appt Scheduled     []  Patient Declined     []  Patient Will Call Back to Schedule     []  Pt Established with External Provider & Updated Care Team             []      Medication Adherence []  Primary Care Appointment Scheduled     []  Added Reminder to Upcoming Primary Care Appt Notes     []  Patient Reminded to  Prescription     []  Patient Declined, Provider Notified if Needed     []  Sent Provider Message to Review and/or Add Exclusion to Problem List             []      Osteoporosis Screening []  DXA Appointment Scheduled     []  External Records Requested     []  Added Reminder to Complete to Upcoming Primary Care Appt Notes     []  Patient Declined     []  Patient Will Call Back to Schedule     []  Patient Will Schedule with External Provider / Order Routed if Applicable     Additional Care Coordinator Notes:     Uploaded [employer] fit kit results for 11/28/2022    Further Action Needed If Patient Returns Outreach:

## 2024-02-20 DIAGNOSIS — Z12.11 SCREENING FOR COLON CANCER: Primary | ICD-10-CM

## 2024-02-20 RX ORDER — POLYETHYLENE GLYCOL 3350, SODIUM SULFATE ANHYDROUS, SODIUM BICARBONATE, SODIUM CHLORIDE, POTASSIUM CHLORIDE 236; 22.74; 6.74; 5.86; 2.97 G/4L; G/4L; G/4L; G/4L; G/4L
4 POWDER, FOR SOLUTION ORAL ONCE
Qty: 4000 ML | Refills: 0 | Status: SHIPPED | OUTPATIENT
Start: 2024-02-20 | End: 2024-02-20

## 2024-02-22 ENCOUNTER — HOSPITAL ENCOUNTER (OUTPATIENT)
Dept: GASTROENTEROLOGY | Facility: HOSPITAL | Age: 69
Discharge: HOME | End: 2024-02-22
Attending: NURSE PRACTITIONER
Payer: MEDICARE

## 2024-02-22 ENCOUNTER — ANESTHESIA (OUTPATIENT)
Dept: GASTROENTEROLOGY | Facility: HOSPITAL | Age: 69
End: 2024-02-22
Payer: MEDICARE

## 2024-02-22 ENCOUNTER — ANESTHESIA EVENT (OUTPATIENT)
Dept: GASTROENTEROLOGY | Facility: HOSPITAL | Age: 69
End: 2024-02-22
Payer: MEDICARE

## 2024-02-22 VITALS
OXYGEN SATURATION: 94 % | RESPIRATION RATE: 19 BRPM | SYSTOLIC BLOOD PRESSURE: 107 MMHG | DIASTOLIC BLOOD PRESSURE: 61 MMHG | HEART RATE: 93 BPM | TEMPERATURE: 97 F

## 2024-02-22 DIAGNOSIS — Z12.11 COLON CANCER SCREENING: Primary | ICD-10-CM

## 2024-02-22 DIAGNOSIS — Z12.11 SCREENING FOR COLON CANCER: ICD-10-CM

## 2024-02-22 PROCEDURE — 45385 COLONOSCOPY W/LESION REMOVAL: CPT | Mod: 33,,, | Performed by: INTERNAL MEDICINE

## 2024-02-22 PROCEDURE — 25000003 PHARM REV CODE 250: Performed by: NURSE ANESTHETIST, CERTIFIED REGISTERED

## 2024-02-22 PROCEDURE — 45385 COLONOSCOPY W/LESION REMOVAL: CPT | Mod: PT | Performed by: INTERNAL MEDICINE

## 2024-02-22 PROCEDURE — 63600175 PHARM REV CODE 636 W HCPCS: Performed by: NURSE ANESTHETIST, CERTIFIED REGISTERED

## 2024-02-22 PROCEDURE — D9220A PRA ANESTHESIA: Mod: PT,,, | Performed by: NURSE ANESTHETIST, CERTIFIED REGISTERED

## 2024-02-22 PROCEDURE — 27201423 OPTIME MED/SURG SUP & DEVICES STERILE SUPPLY

## 2024-02-22 PROCEDURE — 88305 TISSUE EXAM BY PATHOLOGIST: CPT | Mod: TC,SUR | Performed by: INTERNAL MEDICINE

## 2024-02-22 PROCEDURE — 88305 TISSUE EXAM BY PATHOLOGIST: CPT | Mod: 26,,, | Performed by: PATHOLOGY

## 2024-02-22 PROCEDURE — 37000008 HC ANESTHESIA 1ST 15 MINUTES

## 2024-02-22 PROCEDURE — 37000009 HC ANESTHESIA EA ADD 15 MINS

## 2024-02-22 RX ORDER — LIDOCAINE HYDROCHLORIDE 20 MG/ML
INJECTION, SOLUTION EPIDURAL; INFILTRATION; INTRACAUDAL; PERINEURAL
Status: DISCONTINUED | OUTPATIENT
Start: 2024-02-22 | End: 2024-02-22

## 2024-02-22 RX ORDER — SODIUM CHLORIDE 0.9 % (FLUSH) 0.9 %
10 SYRINGE (ML) INJECTION
Status: DISCONTINUED | OUTPATIENT
Start: 2024-02-22 | End: 2024-02-23 | Stop reason: HOSPADM

## 2024-02-22 RX ORDER — PROPOFOL 10 MG/ML
VIAL (ML) INTRAVENOUS
Status: DISCONTINUED | OUTPATIENT
Start: 2024-02-22 | End: 2024-02-22

## 2024-02-22 RX ADMIN — PROPOFOL 50 MG: 10 INJECTION, EMULSION INTRAVENOUS at 10:02

## 2024-02-22 RX ADMIN — LIDOCAINE HYDROCHLORIDE 50 MG: 20 INJECTION, SOLUTION INTRAVENOUS at 10:02

## 2024-02-22 RX ADMIN — SODIUM CHLORIDE: 9 INJECTION, SOLUTION INTRAVENOUS at 10:02

## 2024-02-22 NOTE — TRANSFER OF CARE
Anesthesia Transfer of Care Note    Patient: MARLO HINOJOSA    Procedure(s) Performed: Colonoscopy    Patient location: GI    Anesthesia Type: general    Transport from OR: Transported from OR on room air with adequate spontaneous ventilation. Continuous ECG monitoring in transport. Continuous SpO2 monitoring in transport    Post pain: adequate analgesia    Post assessment: no apparent anesthetic complications    Post vital signs: stable    Level of consciousness: responds to stimulation, awake and sedated    Nausea/Vomiting: no nausea/vomiting    Complications: none    Transfer of care protocol was followed      Last vitals: Visit Vitals  BP (!) 99/55 (BP Location: Right arm, Patient Position: Lying)   Pulse 89   Temp 36 °C (96.8 °F) (Oral)   Resp 16   SpO2 98%

## 2024-02-22 NOTE — ANESTHESIA PREPROCEDURE EVALUATION
02/22/2024  MARLO HINOJOSA is a 68 y.o., male.      Pre-op Assessment    I have reviewed the Patient Summary Reports.     I have reviewed the Nursing Notes. I have reviewed the NPO Status.   I have reviewed the Medications.     Review of Systems  Anesthesia Hx:  No problems with previous Anesthesia             Denies Family Hx of Anesthesia complications.    Denies Personal Hx of Anesthesia complications.                    Social:  Former Smoker       Hematology/Oncology:  Hematology Normal   Oncology Normal                                   EENT/Dental:  EENT/Dental Normal           Cardiovascular:     Hypertension  Past MI CAD   CABG/stent           ECG has been reviewed.                          Pulmonary:   COPD                     Renal/:  Renal/ Normal                 Hepatic/GI:  Bowel Prep.                Musculoskeletal:  Arthritis               Neurological:  Neurology Normal                                      Dermatological:  Skin Normal    Psych:  Psychiatric History  depression              Physical Exam  General: Well nourished    Airway:  Mallampati: II   Mouth Opening: Normal  TM Distance: Normal  Tongue: Normal  Neck ROM: Normal ROM    Dental:  Intact    Anesthesia Plan  Type of Anesthesia, risks & benefits discussed:    Anesthesia Type: Gen Natural Airway  Intra-op Monitoring Plan: Standard ASA Monitors  Post Op Pain Control Plan: multimodal analgesia  Induction:  IV  Informed Consent: Informed consent signed with the Patient and all parties understand the risks and agree with anesthesia plan.  All questions answered. Patient consented to blood products? Yes  ASA Score: 3  Day of Surgery Review of History & Physical: H&P Update referred to the surgeon/provider.I have interviewed and examined the patient. I have reviewed the patient's H&P dated: There are no significant changes.  BMI Counseling: Body mass index is 39.84 kg/m². The BMI is above normal. Nutrition recommendations include reducing portion sizes, decreasing overall calorie intake, 3-5 servings of fruits/vegetables daily and reducing fast food intake. Exercise recommendations include moderate aerobic physical activity for 150 minutes/week, exercising 3-5 times per week and strength training exercises. F/u weight in 3 months. If not losing weight adequately with lifestyle changes, consider weight loss medication. Options briefly discussed today.     Ready For Surgery From Anesthesia Perspective.   .

## 2024-02-22 NOTE — DISCHARGE INSTRUCTIONS
Procedure Date  2/22/24     Impression  Overall Impression:   3 subcentimeter polyps in the cecum were removed with cold snare  Four polyps measuring 10-19 mm in the ascending colon; performed hot snare removal  The ileocecal valve, transverse colon, descending colon and sigmoid colon appeared normal.  (grade 2) hemorrhoids        Recommendation    Await pathology results     Repeat colonoscopy in 3 years         Outcome of procedure: successful Colonoscopy  Disposition: patient to recovery following procedure; discharge to home when appropriate parameters met  Provisions for follow up: please call my office for any unexpected symptoms like chest or abdominal pain or bleeding following your procedure.  Final Diagnosis: colon polyps         Indication  Screening for colon cancer

## 2024-02-22 NOTE — H&P
Gastroenterology Pre-procedure H&P    Chief Complaint: Colon cancer screening    History of Present Illness    MARLO HINOJOSA is a 68 y.o. male that  has a past medical history of CAD, multiple vessel, COPD (chronic obstructive pulmonary disease), Heart disease, Hyperlipidemia, Hypertension, Myocardial infarction (2018), and Neuropathy.     Patient here for routine index screening     Patient denies wt loss, abdominal pain, diarrhea, melena/hematochezia, change in stool caliber, no anticoagulants, FMHx of GI related malignancies.      Past Medical History:   Diagnosis Date    CAD, multiple vessel     COPD (chronic obstructive pulmonary disease)     Heart disease     Hyperlipidemia     Hypertension     Myocardial infarction 2018    Neuropathy        Past Surgical History:   Procedure Laterality Date    CORONARY ARTERY BYPASS GRAFT (CABG)  2017    per Dr. Richards    FRACTURE SURGERY         Family History   Problem Relation Age of Onset    Heart disease Mother     Alzheimer's disease Father     Cancer Sister         Breast    Heart disease Brother     Heart disease Daughter     No Known Problems Son     Heart disease Brother     No Known Problems Daughter     No Known Problems Son        Social History     Socioeconomic History    Marital status:    Tobacco Use    Smoking status: Former     Current packs/day: 0.00     Types: Cigarettes     Quit date: 1970     Years since quittin.1     Passive exposure: Past    Smokeless tobacco: Never   Substance and Sexual Activity    Alcohol use: Yes    Drug use: Never    Sexual activity: Not Currently     Partners: Female       Current Outpatient Medications   Medication Sig Dispense Refill    allopurinoL (ZYLOPRIM) 300 MG tablet Take 300 mg by mouth once daily.      amLODIPine (NORVASC) 10 MG tablet Take 10 mg by mouth once daily.      b complex vitamins tablet Take 1 tablet by mouth once daily.      colchicine, gout, (COLCRYS) 0.6 mg tablet 1.2 mg (2 tablets) PO x 1,  then 0.6 mg in 1 hour. Do not repeat doing for 72 hours 15 tablet 0    cyclobenzaprine (FLEXERIL) 10 MG tablet Take 1 tablet (10 mg total) by mouth nightly as needed for Muscle spasms. 30 tablet 2    DULoxetine (CYMBALTA) 30 MG capsule Take 30 mg by mouth once daily.      folic acid (FOLVITE) 1 MG tablet Take 2 mg by mouth once daily.      furosemide (LASIX) 80 MG tablet Take 80 mg by mouth 2 (two) times daily.      hydroCHLOROthiazide (HYDRODIURIL) 25 MG tablet Take 1 tablet by mouth once daily.      HYDROcodone-acetaminophen (NORCO)  mg per tablet Take 1 tablet by mouth every 6 (six) hours as needed.      losartan (COZAAR) 100 MG tablet Take 100 mg by mouth once daily.      losartan-hydrochlorothiazide 100-12.5 mg (HYZAAR) 100-12.5 mg Tab Take 1 tablet by mouth once daily.      magnesium chloride (MAG 64 ORAL) Take 2 tablets by mouth once daily.      metoprolol tartrate (LOPRESSOR) 25 MG tablet Take 25 mg by mouth once daily.      omega-3 fatty acids/fish oil (FISH OIL-OMEGA-3 FATTY ACIDS) 300-1,000 mg capsule Take 1 capsule by mouth once daily.      potassium chloride (K-TAB) 20 mEq Take 20 mEq by mouth 2 (two) times a day.      pregabalin (LYRICA) 100 MG capsule Take 100 mg by mouth once daily.      REPATHA SURECLICK 140 mg/mL PnIj Inject 140 mg into the skin every 14 (fourteen) days.      vitamin D (VITAMIN D3) 1000 units Tab Take 1,000 Units by mouth once daily.      ZINC ORAL Take 1 tablet by mouth once daily.       No current facility-administered medications for this encounter.       Review of patient's allergies indicates:   Allergen Reactions    Lisinopril Shortness Of Breath     Patient stated that his throat closed and face swelling had to be put on vent.    Atorvastatin     Benazepril hcl     Ezetimibe     Gabapentin     Rosuvastatin        Objective:  Vitals:    02/22/24 0948   BP: (!) 161/91   Pulse: 81   Resp: 12   SpO2: 96%        GEN: normal appearing, NAD, AAO x3  HENT: NCAT, anicteric, OP  benign  CV: normal rate, regular rhythm  RESP: NABS, symmetric rise, unlabored  ABD: soft, ND, no guarding or TTP  SKIN: warm and dry  NEURO: grossly afocal    Assessment and Plan:    Proceed with:    Colonoscopy for screening for colon cancer    Bala Merino MD  Gastroenterology

## 2024-02-22 NOTE — ANESTHESIA POSTPROCEDURE EVALUATION
Anesthesia Post Evaluation    Patient: MARLO HINOJOSA    Procedure(s) Performed: Colonoscopy    Final Anesthesia Type: general      Patient location during evaluation: GI PACU  Patient participation: Yes- Able to Participate  Level of consciousness: awake and alert and oriented  Post-procedure vital signs: reviewed and stable  Pain management: adequate  Airway patency: patent    PONV status at discharge: No PONV  Anesthetic complications: no      Cardiovascular status: blood pressure returned to baseline and stable  Respiratory status: unassisted, spontaneous ventilation and room air  Hydration status: euvolemic  Follow-up not needed.  Comments: Refer to nursing note for pain/mynor score upon discharge from recovery.               Vitals Value Taken Time   BP 99/55 02/22/24 1103   Temp 36 °C (96.8 °F) 02/22/24 1103   Pulse 89 02/22/24 1103   Resp 16 02/22/24 1103   SpO2 98 % 02/22/24 1103         No case tracking events are documented in the log.      Pain/Mynor Score: No data recorded

## 2024-02-23 LAB
ESTROGEN SERPL-MCNC: NORMAL PG/ML
INSULIN SERPL-ACNC: NORMAL U[IU]/ML
LAB AP GROSS DESCRIPTION: NORMAL
LAB AP LABORATORY NOTES: NORMAL
T3RU NFR SERPL: NORMAL %

## 2024-02-23 NOTE — PROGRESS NOTES
Mrs. Hillers, thank you for referring this patient to me. I recommend repeat colonoscopy in 3 years. Please let me know if you have any questions regarding this patient.

## 2024-07-16 ENCOUNTER — APPOINTMENT (RX ONLY)
Dept: URBAN - METROPOLITAN AREA CLINIC 15 | Facility: CLINIC | Age: 69
Setting detail: DERMATOLOGY
End: 2024-07-16

## 2024-07-16 DIAGNOSIS — I78.8 OTHER DISEASES OF CAPILLARIES: ICD-10-CM

## 2024-07-16 DIAGNOSIS — D22 MELANOCYTIC NEVI: ICD-10-CM

## 2024-07-16 DIAGNOSIS — L81.4 OTHER MELANIN HYPERPIGMENTATION: ICD-10-CM

## 2024-07-16 DIAGNOSIS — L82.1 OTHER SEBORRHEIC KERATOSIS: ICD-10-CM

## 2024-07-16 DIAGNOSIS — Z71.89 OTHER SPECIFIED COUNSELING: ICD-10-CM

## 2024-07-16 DIAGNOSIS — D18.0 HEMANGIOMA: ICD-10-CM

## 2024-07-16 PROBLEM — D22.5 MELANOCYTIC NEVI OF TRUNK: Status: ACTIVE | Noted: 2024-07-16

## 2024-07-16 PROBLEM — D18.01 HEMANGIOMA OF SKIN AND SUBCUTANEOUS TISSUE: Status: ACTIVE | Noted: 2024-07-16

## 2024-07-16 PROCEDURE — ? COUNSELING

## 2024-07-16 PROCEDURE — 99203 OFFICE O/P NEW LOW 30 MIN: CPT

## 2024-07-16 ASSESSMENT — LOCATION DETAILED DESCRIPTION DERM
LOCATION DETAILED: RIGHT MID TEMPLE
LOCATION DETAILED: RIGHT MEDIAL INFERIOR CHEST
LOCATION DETAILED: LEFT CENTRAL MALAR CHEEK
LOCATION DETAILED: RIGHT SUPERIOR LATERAL MIDBACK
LOCATION DETAILED: SUPERIOR MID FOREHEAD
LOCATION DETAILED: LEFT INFERIOR UPPER BACK

## 2024-07-16 ASSESSMENT — LOCATION SIMPLE DESCRIPTION DERM
LOCATION SIMPLE: LEFT CHEEK
LOCATION SIMPLE: LEFT UPPER BACK
LOCATION SIMPLE: RIGHT LOWER BACK
LOCATION SIMPLE: CHEST
LOCATION SIMPLE: SUPERIOR FOREHEAD
LOCATION SIMPLE: RIGHT TEMPLE

## 2024-07-16 ASSESSMENT — LOCATION ZONE DERM
LOCATION ZONE: FACE
LOCATION ZONE: TRUNK

## 2025-02-13 ENCOUNTER — OFFICE VISIT (OUTPATIENT)
Dept: OTOLARYNGOLOGY | Facility: CLINIC | Age: 70
End: 2025-02-13
Payer: MEDICARE

## 2025-02-13 VITALS — HEIGHT: 68 IN | BODY MASS INDEX: 36.37 KG/M2 | WEIGHT: 240 LBS

## 2025-02-13 DIAGNOSIS — H65.21 RIGHT CHRONIC SEROUS OTITIS MEDIA: Primary | ICD-10-CM

## 2025-02-13 PROCEDURE — 4010F ACE/ARB THERAPY RXD/TAKEN: CPT | Mod: CPTII,,, | Performed by: OTOLARYNGOLOGY

## 2025-02-13 PROCEDURE — 99999 PR PBB SHADOW E&M-EST. PATIENT-LVL IV: CPT | Mod: PBBFAC,,, | Performed by: OTOLARYNGOLOGY

## 2025-02-13 PROCEDURE — 3008F BODY MASS INDEX DOCD: CPT | Mod: CPTII,,, | Performed by: OTOLARYNGOLOGY

## 2025-02-13 PROCEDURE — 1160F RVW MEDS BY RX/DR IN RCRD: CPT | Mod: CPTII,,, | Performed by: OTOLARYNGOLOGY

## 2025-02-13 PROCEDURE — 99204 OFFICE O/P NEW MOD 45 MIN: CPT | Mod: S$PBB,,, | Performed by: OTOLARYNGOLOGY

## 2025-02-13 PROCEDURE — 1159F MED LIST DOCD IN RCRD: CPT | Mod: CPTII,,, | Performed by: OTOLARYNGOLOGY

## 2025-02-13 PROCEDURE — 99214 OFFICE O/P EST MOD 30 MIN: CPT | Mod: PBBFAC | Performed by: OTOLARYNGOLOGY

## 2025-02-13 RX ORDER — AMOXICILLIN 500 MG/1
500 CAPSULE ORAL 2 TIMES DAILY
Qty: 28 CAPSULE | Refills: 0 | Status: SHIPPED | OUTPATIENT
Start: 2025-02-13

## 2025-02-13 RX ORDER — FLUTICASONE PROPIONATE 50 MCG
2 SPRAY, SUSPENSION (ML) NASAL DAILY
Qty: 16 G | Refills: 2 | Status: SHIPPED | OUTPATIENT
Start: 2025-02-13

## 2025-03-06 ENCOUNTER — OFFICE VISIT (OUTPATIENT)
Dept: OTOLARYNGOLOGY | Facility: CLINIC | Age: 70
End: 2025-03-06
Payer: MEDICARE

## 2025-03-06 VITALS — BODY MASS INDEX: 36.37 KG/M2 | HEIGHT: 68 IN | WEIGHT: 240 LBS

## 2025-03-06 DIAGNOSIS — H90.3 SENSORINEURAL HEARING LOSS (SNHL) OF BOTH EARS: ICD-10-CM

## 2025-03-06 DIAGNOSIS — H65.21 RIGHT CHRONIC SEROUS OTITIS MEDIA: Primary | ICD-10-CM

## 2025-03-06 PROCEDURE — 99214 OFFICE O/P EST MOD 30 MIN: CPT | Mod: S$PBB,,, | Performed by: OTOLARYNGOLOGY

## 2025-03-06 PROCEDURE — 1159F MED LIST DOCD IN RCRD: CPT | Mod: CPTII,,, | Performed by: OTOLARYNGOLOGY

## 2025-03-06 PROCEDURE — 3008F BODY MASS INDEX DOCD: CPT | Mod: CPTII,,, | Performed by: OTOLARYNGOLOGY

## 2025-03-06 PROCEDURE — 99214 OFFICE O/P EST MOD 30 MIN: CPT | Mod: PBBFAC | Performed by: OTOLARYNGOLOGY

## 2025-03-06 PROCEDURE — 4010F ACE/ARB THERAPY RXD/TAKEN: CPT | Mod: CPTII,,, | Performed by: OTOLARYNGOLOGY

## 2025-03-06 PROCEDURE — 99999 PR PBB SHADOW E&M-EST. PATIENT-LVL IV: CPT | Mod: PBBFAC,,, | Performed by: OTOLARYNGOLOGY

## 2025-03-06 PROCEDURE — 1160F RVW MEDS BY RX/DR IN RCRD: CPT | Mod: CPTII,,, | Performed by: OTOLARYNGOLOGY

## 2025-03-06 NOTE — PROGRESS NOTES
Subjective:       Patient ID: MARLO HINOJOSA is a 69 y.o. male.    Chief Complaint: Follow-up (3 week follow up. Patient states he has taken antibiotic and used Flonase as directed. He voices no complaints.)    Follow-up      Review of Systems   HENT:  Positive for hearing loss. Negative for ear pain.    All other systems reviewed and are negative.      Objective:      Physical Exam  General: NAD  Head: Normocephalic, atraumatic, no facial asymmetry/normal strength,  Ears: Both auricules normal in appearance, w/o deformities tympanic membranes fluid better  external auditory canals normal  Nose: External nose w/o deformities normal turbinates no drainage or inflammation  Oral Cavity: Lips, gums, floor of mouth, tongue hard palate, and buccal mucosa without mass/lesion  Oropharynx: Mucosa pink and moist, soft palate, posterior pharynx and oropharyngeal wall without mass/lesion  Neck: Supple, symmetric, trachea midline, no palpable mass/lesion, no palpable cervical lymphadenopathy  Skin: Warm and dry, no concerning lesions  Respiratory: Respirations even, unlabored  Assessment:       1. Right chronic serous otitis media    2. Sensorineural hearing loss (SNHL) of both ears        Plan:       Discussed in detail   Better will need to see outside hearing aid personnel since has Donna

## 2025-07-08 ENCOUNTER — OFFICE VISIT (OUTPATIENT)
Dept: FAMILY MEDICINE | Facility: CLINIC | Age: 70
End: 2025-07-08
Payer: MEDICARE

## 2025-07-08 VITALS
BODY MASS INDEX: 35.64 KG/M2 | TEMPERATURE: 99 F | HEIGHT: 68 IN | OXYGEN SATURATION: 95 % | WEIGHT: 235.19 LBS | DIASTOLIC BLOOD PRESSURE: 85 MMHG | HEART RATE: 68 BPM | SYSTOLIC BLOOD PRESSURE: 138 MMHG | RESPIRATION RATE: 20 BRPM

## 2025-07-08 DIAGNOSIS — J01.00 ACUTE NON-RECURRENT MAXILLARY SINUSITIS: Primary | ICD-10-CM

## 2025-07-08 DIAGNOSIS — I10 PRIMARY HYPERTENSION: Chronic | ICD-10-CM

## 2025-07-08 DIAGNOSIS — Z87.39 HISTORY OF GOUT: ICD-10-CM

## 2025-07-08 DIAGNOSIS — G62.9 NEUROPATHY: Chronic | ICD-10-CM

## 2025-07-08 DIAGNOSIS — J22 LRTI (LOWER RESPIRATORY TRACT INFECTION): ICD-10-CM

## 2025-07-08 DIAGNOSIS — E66.01 SEVERE OBESITY (BMI 35.0-39.9) WITH COMORBIDITY: Chronic | ICD-10-CM

## 2025-07-08 DIAGNOSIS — Z11.59 NEED FOR HEPATITIS C SCREENING TEST: ICD-10-CM

## 2025-07-08 DIAGNOSIS — J44.9 CHRONIC OBSTRUCTIVE PULMONARY DISEASE, UNSPECIFIED COPD TYPE: Chronic | ICD-10-CM

## 2025-07-08 DIAGNOSIS — Z88.8 ALLERGY TO STATIN MEDICATION: ICD-10-CM

## 2025-07-08 DIAGNOSIS — Z79.899 OTHER LONG TERM (CURRENT) DRUG THERAPY: ICD-10-CM

## 2025-07-08 PROBLEM — F33.9 DEPRESSION, RECURRENT: Status: RESOLVED | Noted: 2023-09-05 | Resolved: 2025-07-08

## 2025-07-08 PROBLEM — Z12.11 COLON CANCER SCREENING: Status: RESOLVED | Noted: 2024-02-22 | Resolved: 2025-07-08

## 2025-07-08 PROBLEM — R25.2 LEG CRAMPING: Status: RESOLVED | Noted: 2021-07-06 | Resolved: 2025-07-08

## 2025-07-08 PROBLEM — Z86.16 PERSONAL HISTORY OF COVID-19: Status: RESOLVED | Noted: 2021-05-27 | Resolved: 2025-07-08

## 2025-07-08 LAB
ANION GAP SERPL CALCULATED.3IONS-SCNC: 12 MMOL/L (ref 7–16)
BASOPHILS # BLD AUTO: 0.04 K/UL (ref 0–0.2)
BASOPHILS NFR BLD AUTO: 0.5 % (ref 0–1)
BUN SERPL-MCNC: 8 MG/DL (ref 8–26)
BUN/CREAT SERPL: 9 (ref 6–20)
CALCIUM SERPL-MCNC: 9.1 MG/DL (ref 8.8–10)
CHLORIDE SERPL-SCNC: 102 MMOL/L (ref 98–107)
CO2 SERPL-SCNC: 29 MMOL/L (ref 23–31)
CREAT SERPL-MCNC: 0.89 MG/DL (ref 0.72–1.25)
DIFFERENTIAL METHOD BLD: ABNORMAL
EGFR (NO RACE VARIABLE) (RUSH/TITUS): 93 ML/MIN/1.73M2
EOSINOPHIL # BLD AUTO: 0.15 K/UL (ref 0–0.5)
EOSINOPHIL NFR BLD AUTO: 2 % (ref 1–4)
ERYTHROCYTE [DISTWIDTH] IN BLOOD BY AUTOMATED COUNT: 13.1 % (ref 11.5–14.5)
EST. AVERAGE GLUCOSE BLD GHB EST-MCNC: 103 MG/DL
FOLATE SERPL-MCNC: 4.9 NG/ML (ref 7–31.4)
GLUCOSE SERPL-MCNC: 113 MG/DL (ref 82–115)
HBA1C MFR BLD HPLC: 5.2 %
HCT VFR BLD AUTO: 45.8 % (ref 40–54)
HCV AB SER QL: NORMAL
HGB BLD-MCNC: 15.6 G/DL (ref 13.5–18)
IMM GRANULOCYTES # BLD AUTO: 0.02 K/UL (ref 0–0.04)
IMM GRANULOCYTES NFR BLD: 0.3 % (ref 0–0.4)
LYMPHOCYTES # BLD AUTO: 1.8 K/UL (ref 1–4.8)
LYMPHOCYTES NFR BLD AUTO: 24.2 % (ref 27–41)
MACROCYTES BLD QL SMEAR: ABNORMAL
MCH RBC QN AUTO: 36.7 PG (ref 27–31)
MCHC RBC AUTO-ENTMCNC: 34.1 G/DL (ref 32–36)
MCV RBC AUTO: 107.8 FL (ref 80–96)
MONOCYTES # BLD AUTO: 0.55 K/UL (ref 0–0.8)
MONOCYTES NFR BLD AUTO: 7.4 % (ref 2–6)
MPC BLD CALC-MCNC: 9.9 FL (ref 9.4–12.4)
NEUTROPHILS # BLD AUTO: 4.87 K/UL (ref 1.8–7.7)
NEUTROPHILS NFR BLD AUTO: 65.6 % (ref 53–65)
NRBC # BLD AUTO: 0 X10E3/UL
NRBC, AUTO (.00): 0 %
PLATELET # BLD AUTO: 166 K/UL (ref 150–400)
PLATELET MORPHOLOGY: ABNORMAL
POTASSIUM SERPL-SCNC: 3.2 MMOL/L (ref 3.5–5.1)
RBC # BLD AUTO: 4.25 M/UL (ref 4.6–6.2)
SODIUM SERPL-SCNC: 140 MMOL/L (ref 136–145)
URATE SERPL-MCNC: 6.4 MG/DL (ref 3.5–7.2)
VIT B12 SERPL-MCNC: 346 PG/ML (ref 213–816)
WBC # BLD AUTO: 7.43 K/UL (ref 4.5–11)

## 2025-07-08 PROCEDURE — 1125F AMNT PAIN NOTED PAIN PRSNT: CPT | Mod: ,,, | Performed by: NURSE PRACTITIONER

## 2025-07-08 PROCEDURE — 3079F DIAST BP 80-89 MM HG: CPT | Mod: ,,, | Performed by: NURSE PRACTITIONER

## 2025-07-08 PROCEDURE — 99214 OFFICE O/P EST MOD 30 MIN: CPT | Mod: 25,,, | Performed by: NURSE PRACTITIONER

## 2025-07-08 PROCEDURE — 3008F BODY MASS INDEX DOCD: CPT | Mod: ,,, | Performed by: NURSE PRACTITIONER

## 2025-07-08 PROCEDURE — 3044F HG A1C LEVEL LT 7.0%: CPT | Mod: ,,, | Performed by: NURSE PRACTITIONER

## 2025-07-08 PROCEDURE — 1159F MED LIST DOCD IN RCRD: CPT | Mod: ,,, | Performed by: NURSE PRACTITIONER

## 2025-07-08 PROCEDURE — 3075F SYST BP GE 130 - 139MM HG: CPT | Mod: ,,, | Performed by: NURSE PRACTITIONER

## 2025-07-08 PROCEDURE — 4010F ACE/ARB THERAPY RXD/TAKEN: CPT | Mod: ,,, | Performed by: NURSE PRACTITIONER

## 2025-07-08 PROCEDURE — 96372 THER/PROPH/DIAG INJ SC/IM: CPT | Mod: ,,, | Performed by: NURSE PRACTITIONER

## 2025-07-08 PROCEDURE — 3288F FALL RISK ASSESSMENT DOCD: CPT | Mod: ,,, | Performed by: NURSE PRACTITIONER

## 2025-07-08 PROCEDURE — 1160F RVW MEDS BY RX/DR IN RCRD: CPT | Mod: ,,, | Performed by: NURSE PRACTITIONER

## 2025-07-08 PROCEDURE — 85025 COMPLETE CBC W/AUTO DIFF WBC: CPT | Mod: ,,, | Performed by: CLINICAL MEDICAL LABORATORY

## 2025-07-08 PROCEDURE — 1100F PTFALLS ASSESS-DOCD GE2>/YR: CPT | Mod: ,,, | Performed by: NURSE PRACTITIONER

## 2025-07-08 RX ORDER — DEXAMETHASONE SODIUM PHOSPHATE 4 MG/ML
6 INJECTION, SOLUTION INTRA-ARTICULAR; INTRALESIONAL; INTRAMUSCULAR; INTRAVENOUS; SOFT TISSUE
Status: COMPLETED | OUTPATIENT
Start: 2025-07-08 | End: 2025-07-08

## 2025-07-08 RX ORDER — ASPIRIN 325 MG
325 TABLET, DELAYED RELEASE (ENTERIC COATED) ORAL DAILY
COMMUNITY

## 2025-07-08 RX ORDER — DOXAZOSIN 2 MG/1
2 TABLET ORAL EVERY 12 HOURS
COMMUNITY

## 2025-07-08 RX ORDER — CEFTRIAXONE 1 G/1
1 INJECTION, POWDER, FOR SOLUTION INTRAMUSCULAR; INTRAVENOUS
Status: COMPLETED | OUTPATIENT
Start: 2025-07-08 | End: 2025-07-08

## 2025-07-08 RX ORDER — PROMETHAZINE HYDROCHLORIDE AND DEXTROMETHORPHAN HYDROBROMIDE 6.25; 15 MG/5ML; MG/5ML
5 SYRUP ORAL EVERY 4 HOURS PRN
Qty: 240 ML | Refills: 0 | Status: SHIPPED | OUTPATIENT
Start: 2025-07-08

## 2025-07-08 RX ORDER — DOXYCYCLINE HYCLATE 100 MG
100 TABLET ORAL EVERY 12 HOURS
Qty: 20 TABLET | Refills: 0 | Status: SHIPPED | OUTPATIENT
Start: 2025-07-08 | End: 2025-07-18

## 2025-07-08 RX ADMIN — CEFTRIAXONE 1 G: 1 INJECTION, POWDER, FOR SOLUTION INTRAMUSCULAR; INTRAVENOUS at 10:07

## 2025-07-08 RX ADMIN — DEXAMETHASONE SODIUM PHOSPHATE 6 MG: 4 INJECTION, SOLUTION INTRA-ARTICULAR; INTRALESIONAL; INTRAMUSCULAR; INTRAVENOUS; SOFT TISSUE at 10:07

## 2025-07-08 NOTE — PROGRESS NOTES
[facility]  [address] [place] [address]  Phone: [phone]  Fax: [phone]       PATIENT NAME: MARLO HINOJOSA   : 1955    AGE: 69 y.o. DATE OF ENCOUNTER: 25    MRN: [id number]      Subjective:   CHANGE CHIEF COMPLAINT      :[id number]}274}  Chief Complaint   Patient presents with    Cough     Patient c/o cough off and on for the past couple of months.    Nasal Congestion     Patient c/o sinus congestion for the past couple of months.    Peripheral Neuropathy     Patient c/o pain in both feet and states he has a black toe on each foot.     History of Present Illness    HPI:  Patient last seen by me 21 presents as a walk in and reports a cold that lasted over a month, resolved, then recurred 4 days ago. He developed a fever on Friday night, which subsided last night. He mentions feeling cold and needing additional warmth throughout the night. Patient complains of two toes, one on each foot, that have turned brown. He is uncertain if he injured them or if there is another underlying issue. Patient describes pain in his entire foot, which he attributes to known history of neuropathy. Patient reports difficulty walking and frequent falls due to decreased sensation in his feet. He recounts a recent incident where he fell after his wife accidentally spilled boiling water on the floor, resulting in cuts from a broken bowl. Patient was last evaluated by his cardiologist, Dr. Trejo, about 8 months ago for a vascular check of his legs, which showed no blockages at that time. He was unable to see Dr. Trejo more recently due to insurance issues his next visit as later this mth.  He has an allergy to statins and his lipids are well controlled with Repatha.    Patient denies current cough or sputum production.      ROS:  Constitutional: +fevers, +chills, +falling  Musculoskeletal: +limb pain  Neurological: +numbness         Allergies and Meds: 274}     Review of  patient's allergies indicates:   Allergen Reactions    Lisinopril Shortness Of Breath     Patient stated that his throat closed and face swelling had to be put on vent.    Atorvastatin     Benazepril hcl     Ezetimibe     Gabapentin     Rosuvastatin         Current Outpatient Medications   Medication Sig Dispense Refill    allopurinoL (ZYLOPRIM) 300 MG tablet Take 300 mg by mouth once daily.      amLODIPine (NORVASC) 10 MG tablet Take 10 mg by mouth once daily.      aspirin (ECOTRIN) 325 MG EC tablet Take 325 mg by mouth once daily.      furosemide (LASIX) 80 MG tablet Take 80 mg by mouth 2 (two) times daily.      HYDROcodone-acetaminophen (NORCO)  mg per tablet Take 1 tablet by mouth every 6 (six) hours as needed.      losartan (COZAAR) 100 MG tablet Take 100 mg by mouth once daily.      magnesium chloride (MAG 64 ORAL) Take 2 tablets by mouth once daily.      metoprolol tartrate (LOPRESSOR) 25 MG tablet Take 25 mg by mouth once daily.      potassium chloride (K-TAB) 20 mEq Take 20 mEq by mouth 2 (two) times a day.      REPATHA SURECLICK 140 mg/mL PnIj Inject 140 mg into the skin every 14 (fourteen) days.      doxazosin (CARDURA) 2 MG tablet Take 2 mg by mouth every 12 (twelve) hours.      doxycycline (VIBRA-TABS) 100 MG tablet Take 1 tablet (100 mg total) by mouth every 12 (twelve) hours. for 10 days 20 tablet 0    promethazine-dextromethorphan (PROMETHAZINE-DM) 6.25-15 mg/5 mL Syrp Take 5 mLs by mouth every 4 (four) hours as needed (as needed for cough). 240 mL 0     No current facility-administered medications for this visit.       Labs:274}   I have reviewed labs below:    Lab Results   Component Value Date     (L) 09/05/2023    K 3.3 (L) 09/05/2023    CL 96 (L) 09/05/2023    CALCIUM 9.6 09/05/2023     (H) 09/05/2023    BUN 10 09/05/2023    CREATININE 0.94 09/05/2023    ALT 22 09/05/2023    AST 23 09/05/2023    CHOL 88 09/05/2023    TRIG 100 09/05/2023    HDL 31 (L) 09/05/2023    LDLCALC 37  "09/05/2023    HGBA1C 5.5 09/05/2023       Medical History: 274}     Past Medical History:   Diagnosis Date    CAD, multiple vessel     COPD (chronic obstructive pulmonary disease)     Depression, recurrent 09/05/2023    Heart disease     Hyperlipidemia     Hypertension     Myocardial infarction 2018    Neuropathy       Tobacco Use History[1]   Past Surgical History:   Procedure Laterality Date    CORONARY ANGIOPLASTY WITH STENT PLACEMENT      angioplasty with stents age 51    CORONARY ARTERY BYPASS GRAFT (CABG)  2017    per Dr. Richards    FRACTURE SURGERY          Health Maintenance:      Health Maintenance Topics with due status: Not Due       Topic Last Completion Date    Influenza Vaccine 11/19/2014    Lipid Panel 09/05/2023    Colorectal Cancer Screening 02/22/2024    Hemoglobin A1c (Diabetic Prevention Screening) 07/08/2025       Objective:  274}   Vital Signs  Temp: 98.7 °F (37.1 °C)  Temp Source: Oral  Pulse: 68  Resp: 20  SpO2: 95 %  BP: 138/85  BP Location: Left arm  Patient Position: Sitting  Pain Score:   9  Pain Loc: Foot  Height and Weight  Height: 5' 8" (172.7 cm)  Weight: 106.7 kg (235 lb 3.2 oz)  BSA (Calculated - sq m): 2.26 sq meters  BMI (Calculated): 35.8  Weight in (lb) to have BMI = 25: 164.1    Over the last two weeks how often have you been bothered by little interest or pleasure in doing things: 0  Over the last two weeks how often have you been bothered by feeling down, depressed or hopeless: 0  PHQ-2 Total Score: 0    Wt Readings from Last 3 Encounters:   07/08/25 106.7 kg (235 lb 3.2 oz)   03/06/25 108.9 kg (240 lb)   02/13/25 108.9 kg (240 lb)     Physical Exam  Vitals and nursing note reviewed.   Constitutional:       General: He is not in acute distress.     Appearance: Normal appearance. He is ill-appearing.   HENT:      Head: Normocephalic.      Right Ear: Hearing, tympanic membrane, ear canal and external ear normal.      Left Ear: Hearing, tympanic membrane, ear canal and external ear " normal.      Nose: Congestion and rhinorrhea present. Rhinorrhea is purulent.      Right Sinus: Maxillary sinus tenderness present. No frontal sinus tenderness.      Left Sinus: Maxillary sinus tenderness present. No frontal sinus tenderness.      Mouth/Throat:      Lips: Pink.      Mouth: Mucous membranes are moist.      Tongue: No lesions.      Pharynx: Uvula midline. Postnasal drip present. No pharyngeal swelling, oropharyngeal exudate, posterior oropharyngeal erythema or uvula swelling.   Eyes:      General: No scleral icterus.        Right eye: No discharge.         Left eye: No discharge.      Conjunctiva/sclera: Conjunctivae normal.      Pupils: Pupils are equal, round, and reactive to light.   Cardiovascular:      Rate and Rhythm: Normal rate and regular rhythm.      Pulses: Normal pulses.      Heart sounds: Normal heart sounds.   Pulmonary:      Effort: Pulmonary effort is normal. No respiratory distress.      Breath sounds: Normal breath sounds. No wheezing, rhonchi or rales.   Musculoskeletal:      Cervical back: No rigidity.   Lymphadenopathy:      Cervical: No cervical adenopathy.   Skin:     General: Skin is warm and dry.      Coloration: Skin is not jaundiced or pale.      Findings: Bruising (of toes each foot, see pic below) present. No rash.   Neurological:      General: No focal deficit present.      Mental Status: He is alert and oriented to person, place, and time.      Gait: Gait normal.   Psychiatric:         Mood and Affect: Mood normal.         Behavior: Behavior normal.         Thought Content: Thought content normal.         Judgment: Judgment normal.          Assessment and Plan: 274}       1. Acute non-recurrent maxillary sinusitis  -     cefTRIAXone injection 1 g  -     dexAMETHasone injection 6 mg  -     doxycycline (VIBRA-TABS) 100 MG tablet; Take 1 tablet (100 mg total) by mouth every 12 (twelve) hours. for 10 days  Dispense: 20 tablet; Refill: 0    2. Chronic obstructive pulmonary  disease, unspecified COPD type  Assessment & Plan:  Not followed by pulmonary.  Former smoker, quit 17 yrs ago.      3. Neuropathy  Assessment & Plan:  Fall precautions.  Can't take gabapentin or lyrica.    Orders:  -     Hemoglobin A1C; Future; Expected date: 07/08/2025  -     Vitamin B12 & Folate; Future; Expected date: 07/08/2025    4. LRTI (lower respiratory tract infection)  Assessment & Plan:  Cover for pneumonia due to length of illness with recurrence 4 days ago and running fever, purulent sputum production with cough.    Orders:  -     X-Ray Chest PA And Lateral; Future; Expected date: 07/08/2025  -     CBC Auto Differential; Future; Expected date: 07/08/2025  -     promethazine-dextromethorphan (PROMETHAZINE-DM) 6.25-15 mg/5 mL Syrp; Take 5 mLs by mouth every 4 (four) hours as needed (as needed for cough).  Dispense: 240 mL; Refill: 0  -     CBC Morphology    5. Other long term (current) drug therapy  -     CBC Auto Differential; Future; Expected date: 07/08/2025  -     Basic Metabolic Panel; Future; Expected date: 07/08/2025  -     Hemoglobin A1C; Future; Expected date: 07/08/2025  -     Vitamin B12 & Folate; Future; Expected date: 07/08/2025  -     CBC Morphology    6. Need for hepatitis C screening test  -     Hepatitis C Antibody; Future; Expected date: 07/08/2025    7. History of gout  Assessment & Plan:  Controlled with allopurinol.  Update uric acid level.    Orders:  -     Uric Acid; Future; Expected date: 07/08/2025    8. Allergy to statin medication    9. Severe obesity (BMI 35.0-39.9) with comorbidity  Assessment & Plan:  Body mass index is 35.76 kg/m². Morbid obesity complicates all aspects of disease management from diagnostic modalities to treatment. Weight loss encouraged and health benefits explained to patient.       10. Primary hypertension  Assessment & Plan:  Fairly well controlled    BP Readings from Last 3 Encounters:   07/08/25 138/85   02/22/24 107/61   09/05/23 (!) 147/74      Followed by Cardiologist, Dr. Trejo, of [facility]. Next visit end of July.          Assessment & Plan    IMPRESSION:  - Assessed respiratory symptoms, considering recent cold history and recurrence.  - Evaluated cardiovascular status, noting history of open heart surgery and stents.  - Examined discolored toes on both feet, likely due to bruising from recent fall.  - Considered neuropathy as contributing factor to balance issues and foot pain.  - Initiated empiric treatment for presumed pneumonia given prolonged illness course with fever.    HEART DISEASE:  - Monitored patient's cardiac history including open heart surgery, triple bypass, and stents placed at age 51.  - Blood pressure is managed with Losartan and amlodipine per Dr. Trejo of [facility].  Cholesterol is well-controlled (last recorded at 78 per patient) with Repatha. Is allergic to statins.  - Advised patient to have cardiologist evaluate toe discoloration and pain for vascular assessment; he reports this was done last visit.  - Ordered lab work to assess overall health status and keep scheduled follow-up visit with cardiologist Dr. Trejo end of July as scheduled.  - Patient instructed to call back with current medication list for reconciliation (done and updated per NAOMI Cunningham LPN).  - Follow up in 6 months for routine primary care monitoring due to last visit 4 years and advised should follow up with PCP at least yearly.    NEUROPATHY AND FOOT PAIN:  - Monitored the patient's reports of painful feet with decreased sensation due to neuropathy, which has contributed to falling.  - Evaluated bruised toes, particularly the third toe which appears bruised though no injury is recalled.  - The whole foot is painful with neuropathy.  - Planned to take pictures of the toes for records.  - Ordered A1C test for diabetes screening due to neuropathy symptoms.    RESPIRATORY INFECTION:  - Monitored the patient's report of a cold from a month ago that returned 4 days ago  with fever.  - Evaluated respiratory symptoms and planned to cover the patient for pneumonia with prescribed treatment.  - Ordered chest XR to evaluate respiratory condition.  - Administered intramuscular injections today.    GENERAL FOLLOW-UP AND MONITORING:  - Assessed the need to reconcile medications due to confusion about current prescriptions.       Follow up in about 6 months (around 2026) for Primary care monitoring, hypertension.    Signature:  GILDARDO Arguello-BC    Future Appointments   Date Time Provider Department Center   2026 11:20 AM Hillary Richards FNP [facility] [place]     This note was generated with the assistance of ambient listening technology. Verbal consent was obtained by the patient and accompanying visitor(s) for the recording of patient appointment to facilitate this note. I attest to having reviewed and edited the generated note for accuracy, though some syntax or spelling errors may persist. Please contact the author of this note for any clarification.           [1]   Social History  Tobacco Use   Smoking Status Former    Current packs/day: 0.00    Types: Cigarettes    Quit date: 1970    Years since quittin.5    Passive exposure: Past   Smokeless Tobacco Never

## 2025-07-08 NOTE — ASSESSMENT & PLAN NOTE
Cover for pneumonia due to length of illness with recurrence 4 days ago and running fever, purulent sputum production with cough.

## 2025-07-08 NOTE — ASSESSMENT & PLAN NOTE
Fairly well controlled    BP Readings from Last 3 Encounters:   07/08/25 138/85   02/22/24 107/61   09/05/23 (!) 147/74     Followed by Cardiologist, Dr. Trejo, of [facility]. Next visit end of July.

## 2025-07-08 NOTE — ASSESSMENT & PLAN NOTE
Body mass index is 35.76 kg/m². Morbid obesity complicates all aspects of disease management from diagnostic modalities to treatment. Weight loss encouraged and health benefits explained to patient.

## 2025-07-10 PROBLEM — E87.6 HYPOKALEMIA: Chronic | Status: ACTIVE | Noted: 2025-07-10

## 2025-07-10 PROBLEM — E53.8 FOLATE DEFICIENCY: Status: ACTIVE | Noted: 2025-07-10
